# Patient Record
Sex: FEMALE | Race: WHITE | NOT HISPANIC OR LATINO
[De-identification: names, ages, dates, MRNs, and addresses within clinical notes are randomized per-mention and may not be internally consistent; named-entity substitution may affect disease eponyms.]

---

## 2017-07-11 PROBLEM — Z00.00 ENCOUNTER FOR PREVENTIVE HEALTH EXAMINATION: Status: ACTIVE | Noted: 2017-07-11

## 2017-07-21 ENCOUNTER — RESULT REVIEW (OUTPATIENT)
Age: 47
End: 2017-07-21

## 2017-07-21 ENCOUNTER — APPOINTMENT (OUTPATIENT)
Dept: WOUND CARE | Facility: CLINIC | Age: 47
End: 2017-07-21

## 2017-07-21 VITALS
BODY MASS INDEX: 35.82 KG/M2 | TEMPERATURE: 98.7 F | HEART RATE: 78 BPM | HEIGHT: 65 IN | DIASTOLIC BLOOD PRESSURE: 91 MMHG | SYSTOLIC BLOOD PRESSURE: 146 MMHG | RESPIRATION RATE: 16 BRPM | WEIGHT: 215 LBS

## 2017-07-21 DIAGNOSIS — Z87.42 PERSONAL HISTORY OF OTHER DISEASES OF THE FEMALE GENITAL TRACT: ICD-10-CM

## 2017-07-21 RX ORDER — AMLODIPINE BESYLATE 10 MG/1
10 TABLET ORAL
Refills: 0 | Status: ACTIVE | COMMUNITY

## 2017-07-24 ENCOUNTER — APPOINTMENT (OUTPATIENT)
Dept: VASCULAR SURGERY | Facility: CLINIC | Age: 47
End: 2017-07-24

## 2017-07-27 ENCOUNTER — APPOINTMENT (OUTPATIENT)
Dept: WOUND CARE | Facility: CLINIC | Age: 47
End: 2017-07-27
Payer: COMMERCIAL

## 2017-07-27 PROCEDURE — 11042 DBRDMT SUBQ TIS 1ST 20SQCM/<: CPT

## 2017-07-31 ENCOUNTER — APPOINTMENT (OUTPATIENT)
Dept: VASCULAR SURGERY | Facility: CLINIC | Age: 47
End: 2017-07-31
Payer: COMMERCIAL

## 2017-07-31 ENCOUNTER — APPOINTMENT (OUTPATIENT)
Dept: WOUND CARE | Facility: CLINIC | Age: 47
End: 2017-07-31
Payer: COMMERCIAL

## 2017-07-31 PROCEDURE — 93970 EXTREMITY STUDY: CPT

## 2017-07-31 PROCEDURE — 99213 OFFICE O/P EST LOW 20 MIN: CPT | Mod: 25

## 2017-07-31 PROCEDURE — 29581 APPL MULTLAYER CMPRN SYS LEG: CPT | Mod: LT

## 2017-07-31 RX ORDER — CEPHALEXIN 500 MG/1
500 CAPSULE ORAL
Qty: 20 | Refills: 0 | Status: COMPLETED | COMMUNITY
Start: 2017-06-12

## 2017-08-09 ENCOUNTER — APPOINTMENT (OUTPATIENT)
Dept: WOUND CARE | Facility: CLINIC | Age: 47
End: 2017-08-09
Payer: COMMERCIAL

## 2017-08-09 VITALS
HEART RATE: 74 BPM | TEMPERATURE: 98.4 F | SYSTOLIC BLOOD PRESSURE: 130 MMHG | RESPIRATION RATE: 16 BRPM | DIASTOLIC BLOOD PRESSURE: 70 MMHG

## 2017-08-09 PROCEDURE — 29581 APPL MULTLAYER CMPRN SYS LEG: CPT | Mod: LT

## 2017-08-16 ENCOUNTER — APPOINTMENT (OUTPATIENT)
Dept: WOUND CARE | Facility: CLINIC | Age: 47
End: 2017-08-16
Payer: COMMERCIAL

## 2017-08-16 VITALS
RESPIRATION RATE: 16 BRPM | SYSTOLIC BLOOD PRESSURE: 157 MMHG | TEMPERATURE: 98.6 F | DIASTOLIC BLOOD PRESSURE: 97 MMHG | HEART RATE: 77 BPM

## 2017-08-16 PROCEDURE — 97597 DBRDMT OPN WND 1ST 20 CM/<: CPT

## 2017-08-23 ENCOUNTER — APPOINTMENT (OUTPATIENT)
Dept: WOUND CARE | Facility: CLINIC | Age: 47
End: 2017-08-23
Payer: COMMERCIAL

## 2017-08-23 PROCEDURE — 99213 OFFICE O/P EST LOW 20 MIN: CPT

## 2017-08-28 ENCOUNTER — MESSAGE (OUTPATIENT)
Age: 47
End: 2017-08-28

## 2017-08-30 ENCOUNTER — APPOINTMENT (OUTPATIENT)
Dept: WOUND CARE | Facility: CLINIC | Age: 47
End: 2017-08-30
Payer: COMMERCIAL

## 2017-08-30 LAB
BACTERIA WND CULT: ABNORMAL
BACTERIA WND CULT: ABNORMAL

## 2017-08-30 PROCEDURE — 99213 OFFICE O/P EST LOW 20 MIN: CPT

## 2017-09-08 ENCOUNTER — APPOINTMENT (OUTPATIENT)
Dept: WOUND CARE | Facility: CLINIC | Age: 47
End: 2017-09-08
Payer: COMMERCIAL

## 2017-09-08 PROCEDURE — 99213 OFFICE O/P EST LOW 20 MIN: CPT

## 2017-09-14 ENCOUNTER — APPOINTMENT (OUTPATIENT)
Dept: ALLERGY | Facility: CLINIC | Age: 47
End: 2017-09-14
Payer: COMMERCIAL

## 2017-09-14 VITALS
BODY MASS INDEX: 36.65 KG/M2 | HEIGHT: 65 IN | SYSTOLIC BLOOD PRESSURE: 160 MMHG | RESPIRATION RATE: 14 BRPM | WEIGHT: 220 LBS | HEART RATE: 80 BPM | DIASTOLIC BLOOD PRESSURE: 100 MMHG

## 2017-09-14 PROCEDURE — 99244 OFF/OP CNSLTJ NEW/EST MOD 40: CPT

## 2017-09-25 ENCOUNTER — APPOINTMENT (OUTPATIENT)
Dept: ALLERGY | Facility: CLINIC | Age: 47
End: 2017-09-25
Payer: COMMERCIAL

## 2017-09-25 VITALS
SYSTOLIC BLOOD PRESSURE: 150 MMHG | DIASTOLIC BLOOD PRESSURE: 90 MMHG | WEIGHT: 220 LBS | BODY MASS INDEX: 36.65 KG/M2 | HEIGHT: 65 IN | RESPIRATION RATE: 14 BRPM | HEART RATE: 72 BPM

## 2017-09-25 PROCEDURE — 99214 OFFICE O/P EST MOD 30 MIN: CPT

## 2017-09-27 ENCOUNTER — APPOINTMENT (OUTPATIENT)
Dept: WOUND CARE | Facility: CLINIC | Age: 47
End: 2017-09-27
Payer: COMMERCIAL

## 2017-09-27 PROCEDURE — 99213 OFFICE O/P EST LOW 20 MIN: CPT

## 2017-10-18 ENCOUNTER — APPOINTMENT (OUTPATIENT)
Dept: WOUND CARE | Facility: CLINIC | Age: 47
End: 2017-10-18
Payer: COMMERCIAL

## 2017-10-18 PROCEDURE — 99213 OFFICE O/P EST LOW 20 MIN: CPT

## 2017-11-03 ENCOUNTER — APPOINTMENT (OUTPATIENT)
Dept: DERMATOLOGY | Facility: CLINIC | Age: 47
End: 2017-11-03
Payer: COMMERCIAL

## 2017-11-03 ENCOUNTER — MOBILE ON CALL (OUTPATIENT)
Age: 47
End: 2017-11-03

## 2017-11-03 VITALS — DIASTOLIC BLOOD PRESSURE: 72 MMHG | SYSTOLIC BLOOD PRESSURE: 120 MMHG

## 2017-11-03 DIAGNOSIS — Z91.89 OTHER SPECIFIED PERSONAL RISK FACTORS, NOT ELSEWHERE CLASSIFIED: ICD-10-CM

## 2017-11-03 DIAGNOSIS — Z86.79 PERSONAL HISTORY OF OTHER DISEASES OF THE CIRCULATORY SYSTEM: ICD-10-CM

## 2017-11-03 PROCEDURE — 99203 OFFICE O/P NEW LOW 30 MIN: CPT

## 2017-11-17 ENCOUNTER — APPOINTMENT (OUTPATIENT)
Dept: DERMATOLOGY | Facility: CLINIC | Age: 47
End: 2017-11-17
Payer: COMMERCIAL

## 2017-11-17 VITALS — DIASTOLIC BLOOD PRESSURE: 90 MMHG | SYSTOLIC BLOOD PRESSURE: 130 MMHG

## 2017-11-17 DIAGNOSIS — L25.9 UNSPECIFIED CONTACT DERMATITIS, UNSPECIFIED CAUSE: ICD-10-CM

## 2017-11-17 PROCEDURE — 99213 OFFICE O/P EST LOW 20 MIN: CPT

## 2017-11-17 RX ORDER — OLMESARTAN MEDOXOMIL 40 MG/1
TABLET, FILM COATED ORAL
Refills: 0 | Status: ACTIVE | COMMUNITY

## 2017-11-17 RX ORDER — CEPHALEXIN 500 MG/1
500 TABLET ORAL
Qty: 14 | Refills: 0 | Status: DISCONTINUED | COMMUNITY
Start: 2017-11-03 | End: 2017-11-17

## 2017-12-04 ENCOUNTER — INPATIENT (INPATIENT)
Facility: HOSPITAL | Age: 47
LOS: 5 days | Discharge: HOME | End: 2017-12-10
Attending: SURGERY | Admitting: SURGERY
Payer: COMMERCIAL

## 2017-12-04 VITALS
TEMPERATURE: 98 F | OXYGEN SATURATION: 99 % | HEART RATE: 89 BPM | SYSTOLIC BLOOD PRESSURE: 128 MMHG | DIASTOLIC BLOOD PRESSURE: 80 MMHG | RESPIRATION RATE: 19 BRPM

## 2017-12-04 DIAGNOSIS — Z98.89 OTHER SPECIFIED POSTPROCEDURAL STATES: Chronic | ICD-10-CM

## 2017-12-04 LAB
ALBUMIN SERPL ELPH-MCNC: 4.2 G/DL — SIGNIFICANT CHANGE UP (ref 3.3–5)
ALP SERPL-CCNC: 64 U/L — SIGNIFICANT CHANGE UP (ref 40–120)
ALT FLD-CCNC: 35 U/L — HIGH (ref 4–33)
AST SERPL-CCNC: 21 U/L — SIGNIFICANT CHANGE UP (ref 4–32)
BASOPHILS # BLD AUTO: 0.04 K/UL — SIGNIFICANT CHANGE UP (ref 0–0.2)
BASOPHILS NFR BLD AUTO: 0.4 % — SIGNIFICANT CHANGE UP (ref 0–2)
BASOPHILS NFR SPEC: 0 % — SIGNIFICANT CHANGE UP (ref 0–2)
BILIRUB SERPL-MCNC: 0.7 MG/DL — SIGNIFICANT CHANGE UP (ref 0.2–1.2)
BUN SERPL-MCNC: 31 MG/DL — HIGH (ref 7–23)
CALCIUM SERPL-MCNC: 10.4 MG/DL — SIGNIFICANT CHANGE UP (ref 8.4–10.5)
CHLORIDE SERPL-SCNC: 91 MMOL/L — LOW (ref 98–107)
CO2 SERPL-SCNC: 36 MMOL/L — HIGH (ref 22–31)
CREAT SERPL-MCNC: 1.05 MG/DL — SIGNIFICANT CHANGE UP (ref 0.5–1.3)
EOSINOPHIL # BLD AUTO: 0.01 K/UL — SIGNIFICANT CHANGE UP (ref 0–0.5)
EOSINOPHIL NFR BLD AUTO: 0.1 % — SIGNIFICANT CHANGE UP (ref 0–6)
EOSINOPHIL NFR FLD: 0 % — SIGNIFICANT CHANGE UP (ref 0–6)
GIANT PLATELETS BLD QL SMEAR: PRESENT — SIGNIFICANT CHANGE UP
GLUCOSE SERPL-MCNC: 129 MG/DL — HIGH (ref 70–99)
HCT VFR BLD CALC: 47.3 % — HIGH (ref 34.5–45)
HGB BLD-MCNC: 15.5 G/DL — SIGNIFICANT CHANGE UP (ref 11.5–15.5)
IMM GRANULOCYTES # BLD AUTO: 0.02 # — SIGNIFICANT CHANGE UP
IMM GRANULOCYTES NFR BLD AUTO: 0.2 % — SIGNIFICANT CHANGE UP (ref 0–1.5)
LIDOCAIN IGE QN: 20.6 U/L — SIGNIFICANT CHANGE UP (ref 7–60)
LYMPHOCYTES # BLD AUTO: 1.58 K/UL — SIGNIFICANT CHANGE UP (ref 1–3.3)
LYMPHOCYTES # BLD AUTO: 15.8 % — SIGNIFICANT CHANGE UP (ref 13–44)
LYMPHOCYTES NFR SPEC AUTO: 7.8 % — LOW (ref 13–44)
MCHC RBC-ENTMCNC: 28.9 PG — SIGNIFICANT CHANGE UP (ref 27–34)
MCHC RBC-ENTMCNC: 32.8 % — SIGNIFICANT CHANGE UP (ref 32–36)
MCV RBC AUTO: 88.1 FL — SIGNIFICANT CHANGE UP (ref 80–100)
MONOCYTES # BLD AUTO: 1.3 K/UL — HIGH (ref 0–0.9)
MONOCYTES NFR BLD AUTO: 13 % — SIGNIFICANT CHANGE UP (ref 2–14)
MONOCYTES NFR BLD: 10.4 % — HIGH (ref 2–9)
MORPHOLOGY BLD-IMP: NORMAL — SIGNIFICANT CHANGE UP
NEUTROPHIL AB SER-ACNC: 76.5 % — SIGNIFICANT CHANGE UP (ref 43–77)
NEUTROPHILS # BLD AUTO: 7.08 K/UL — SIGNIFICANT CHANGE UP (ref 1.8–7.4)
NEUTROPHILS NFR BLD AUTO: 70.5 % — SIGNIFICANT CHANGE UP (ref 43–77)
NRBC # FLD: 0 — SIGNIFICANT CHANGE UP
OTHER - HEMATOLOGY %: 0.9 — SIGNIFICANT CHANGE UP
PLATELET # BLD AUTO: 267 K/UL — SIGNIFICANT CHANGE UP (ref 150–400)
PLATELET COUNT - ESTIMATE: NORMAL — SIGNIFICANT CHANGE UP
PMV BLD: 11 FL — SIGNIFICANT CHANGE UP (ref 7–13)
POTASSIUM SERPL-MCNC: 3.6 MMOL/L — SIGNIFICANT CHANGE UP (ref 3.5–5.3)
POTASSIUM SERPL-SCNC: 3.6 MMOL/L — SIGNIFICANT CHANGE UP (ref 3.5–5.3)
PROT SERPL-MCNC: 8.1 G/DL — SIGNIFICANT CHANGE UP (ref 6–8.3)
RBC # BLD: 5.37 M/UL — HIGH (ref 3.8–5.2)
RBC # FLD: 14.4 % — SIGNIFICANT CHANGE UP (ref 10.3–14.5)
REVIEW TO FOLLOW: YES — SIGNIFICANT CHANGE UP
SODIUM SERPL-SCNC: 141 MMOL/L — SIGNIFICANT CHANGE UP (ref 135–145)
VARIANT LYMPHS # BLD: 4.4 % — SIGNIFICANT CHANGE UP
WBC # BLD: 10.03 K/UL — SIGNIFICANT CHANGE UP (ref 3.8–10.5)
WBC # FLD AUTO: 10.03 K/UL — SIGNIFICANT CHANGE UP (ref 3.8–10.5)

## 2017-12-04 PROCEDURE — 74177 CT ABD & PELVIS W/CONTRAST: CPT | Mod: 26

## 2017-12-04 RX ORDER — SODIUM CHLORIDE 9 MG/ML
1000 INJECTION INTRAMUSCULAR; INTRAVENOUS; SUBCUTANEOUS ONCE
Qty: 0 | Refills: 0 | Status: COMPLETED | OUTPATIENT
Start: 2017-12-04 | End: 2017-12-04

## 2017-12-04 RX ORDER — ONDANSETRON 8 MG/1
4 TABLET, FILM COATED ORAL ONCE
Qty: 0 | Refills: 0 | Status: COMPLETED | OUTPATIENT
Start: 2017-12-04 | End: 2017-12-04

## 2017-12-04 RX ORDER — FAMOTIDINE 10 MG/ML
20 INJECTION INTRAVENOUS ONCE
Qty: 0 | Refills: 0 | Status: COMPLETED | OUTPATIENT
Start: 2017-12-04 | End: 2017-12-04

## 2017-12-04 RX ADMIN — SODIUM CHLORIDE 2000 MILLILITER(S): 9 INJECTION INTRAMUSCULAR; INTRAVENOUS; SUBCUTANEOUS at 20:37

## 2017-12-04 RX ADMIN — FAMOTIDINE 20 MILLIGRAM(S): 10 INJECTION INTRAVENOUS at 20:37

## 2017-12-04 RX ADMIN — ONDANSETRON 4 MILLIGRAM(S): 8 TABLET, FILM COATED ORAL at 20:37

## 2017-12-04 NOTE — ED ADULT TRIAGE NOTE - CHIEF COMPLAINT QUOTE
Patient brought to ER from clinic by EMS for c/o orthostatic blood pressure. Pt was nauseous and Pt was given Zofran 4mg via 22 gauge right AC. Pt is here for epigastric burning.

## 2017-12-04 NOTE — ED PROVIDER NOTE - OBJECTIVE STATEMENT
48 y/o female hx HTN presents to ED c/o abdominal pain nausea vomit x 3 days. Pt. states 3 days ago she started to develop some mild epigastric discomfort which has progressively worsened over past 3 days with multiple episodes of nausea and bilious vomiting. States last BM yesterday morning. Pt. went to PMD today and states became dizzy there - was thought to possibly be dehydrated, given iv nasal saline and started to vomit again and was sent to ED for r/o sbo. Denies constipation, bloody stool, loc, cp sob.

## 2017-12-04 NOTE — ED ADULT NURSE NOTE - OBJECTIVE STATEMENT
pt received intake 12, alert and orientedx3. c.o abdominal pain with nausea/vomiting x3days. went to pmd and also c.o dizziness there. denies cp sob. respirations even unlabored. iv placed, labs sent. medicated per mar. will monitor.

## 2017-12-04 NOTE — ED PROVIDER NOTE - ATTENDING CONTRIBUTION TO CARE
I performed a face to face bedside interview with patient regarding history of present illness, review of symptoms and past medical history. I completed an independent physical exam.  I have discussed patient's plan of care.   I agree with note as stated above, having amended the EMR as needed to reflect my findings. I have discussed the assessment and plan of care.  This includes during the time I functioned as the attending physician for this patient.  Attending Contribution to Care: agree with plan of PA. pt p/w vague diffuse abd pain, with n/v. last passed gas 3 days ago. pending ct for sbo, if neg. will d/c with gastro meds. otherwise admit to surgery for sbo. pt with no active vomiting at this point.

## 2017-12-04 NOTE — ED PROVIDER NOTE - MEDICAL DECISION MAKING DETAILS
48 y/o female c/o abdominal pain nausea and vomiting  -r/o sbo vs gastritis vs gastroenteritis  -pain control, antiemetics  -ct abd pelvis

## 2017-12-05 ENCOUNTER — RESULT REVIEW (OUTPATIENT)
Age: 47
End: 2017-12-05

## 2017-12-05 DIAGNOSIS — N80.9 ENDOMETRIOSIS, UNSPECIFIED: Chronic | ICD-10-CM

## 2017-12-05 DIAGNOSIS — K56.690 OTHER PARTIAL INTESTINAL OBSTRUCTION: ICD-10-CM

## 2017-12-05 LAB
APTT BLD: 29.5 SEC — SIGNIFICANT CHANGE UP (ref 27.5–37.4)
BASE EXCESS BLDV CALC-SCNC: 11 MMOL/L — SIGNIFICANT CHANGE UP
BLD GP AB SCN SERPL QL: NEGATIVE — SIGNIFICANT CHANGE UP
BLOOD GAS VENOUS - CREATININE: 0.95 MG/DL — SIGNIFICANT CHANGE UP (ref 0.5–1.3)
BUN SERPL-MCNC: 18 MG/DL — SIGNIFICANT CHANGE UP (ref 7–23)
BUN SERPL-MCNC: 22 MG/DL — SIGNIFICANT CHANGE UP (ref 7–23)
CALCIUM SERPL-MCNC: 8.9 MG/DL — SIGNIFICANT CHANGE UP (ref 8.4–10.5)
CALCIUM SERPL-MCNC: 9.1 MG/DL — SIGNIFICANT CHANGE UP (ref 8.4–10.5)
CHLORIDE BLDV-SCNC: 94 MMOL/L — LOW (ref 96–108)
CHLORIDE SERPL-SCNC: 97 MMOL/L — LOW (ref 98–107)
CHLORIDE SERPL-SCNC: 98 MMOL/L — SIGNIFICANT CHANGE UP (ref 98–107)
CO2 SERPL-SCNC: 31 MMOL/L — SIGNIFICANT CHANGE UP (ref 22–31)
CO2 SERPL-SCNC: 34 MMOL/L — HIGH (ref 22–31)
CREAT SERPL-MCNC: 0.86 MG/DL — SIGNIFICANT CHANGE UP (ref 0.5–1.3)
CREAT SERPL-MCNC: 0.86 MG/DL — SIGNIFICANT CHANGE UP (ref 0.5–1.3)
GAS PNL BLDV: 130 MMOL/L — LOW (ref 136–146)
GLUCOSE BLDV-MCNC: 138 — HIGH (ref 70–99)
GLUCOSE SERPL-MCNC: 121 MG/DL — HIGH (ref 70–99)
GLUCOSE SERPL-MCNC: 157 MG/DL — HIGH (ref 70–99)
GRAM STN FLD: SIGNIFICANT CHANGE UP
HCO3 BLDV-SCNC: 34 MMOL/L — HIGH (ref 20–27)
HCT VFR BLD CALC: 41.9 % — SIGNIFICANT CHANGE UP (ref 34.5–45)
HCT VFR BLDV CALC: 46.6 % — HIGH (ref 34.5–45)
HGB BLD-MCNC: 13.2 G/DL — SIGNIFICANT CHANGE UP (ref 11.5–15.5)
HGB BLDV-MCNC: 15.2 G/DL — SIGNIFICANT CHANGE UP (ref 11.5–15.5)
INR BLD: 1.04 — SIGNIFICANT CHANGE UP (ref 0.88–1.17)
LACTATE BLDV-MCNC: 1.3 MMOL/L — SIGNIFICANT CHANGE UP (ref 0.5–2)
MAGNESIUM SERPL-MCNC: 1.9 MG/DL — SIGNIFICANT CHANGE UP (ref 1.6–2.6)
MCHC RBC-ENTMCNC: 27.8 PG — SIGNIFICANT CHANGE UP (ref 27–34)
MCHC RBC-ENTMCNC: 31.5 % — LOW (ref 32–36)
MCV RBC AUTO: 88.2 FL — SIGNIFICANT CHANGE UP (ref 80–100)
NRBC # FLD: 0 — SIGNIFICANT CHANGE UP
PCO2 BLDV: 49 MMHG — SIGNIFICANT CHANGE UP (ref 41–51)
PH BLDV: 7.47 PH — HIGH (ref 7.32–7.43)
PLATELET # BLD AUTO: 247 K/UL — SIGNIFICANT CHANGE UP (ref 150–400)
PMV BLD: 11.1 FL — SIGNIFICANT CHANGE UP (ref 7–13)
PO2 BLDV: 57 MMHG — HIGH (ref 35–40)
POTASSIUM BLDV-SCNC: 3 MMOL/L — LOW (ref 3.4–4.5)
POTASSIUM SERPL-MCNC: 2.9 MMOL/L — CRITICAL LOW (ref 3.5–5.3)
POTASSIUM SERPL-MCNC: 3.2 MMOL/L — LOW (ref 3.5–5.3)
POTASSIUM SERPL-SCNC: 2.9 MMOL/L — CRITICAL LOW (ref 3.5–5.3)
POTASSIUM SERPL-SCNC: 3.2 MMOL/L — LOW (ref 3.5–5.3)
PROTHROM AB SERPL-ACNC: 11.7 SEC — SIGNIFICANT CHANGE UP (ref 9.8–13.1)
RBC # BLD: 4.75 M/UL — SIGNIFICANT CHANGE UP (ref 3.8–5.2)
RBC # FLD: 14.2 % — SIGNIFICANT CHANGE UP (ref 10.3–14.5)
RH IG SCN BLD-IMP: POSITIVE — SIGNIFICANT CHANGE UP
SAO2 % BLDV: 89.4 % — HIGH (ref 60–85)
SODIUM SERPL-SCNC: 140 MMOL/L — SIGNIFICANT CHANGE UP (ref 135–145)
SODIUM SERPL-SCNC: 141 MMOL/L — SIGNIFICANT CHANGE UP (ref 135–145)
SPECIMEN SOURCE: SIGNIFICANT CHANGE UP
WBC # BLD: 7.08 K/UL — SIGNIFICANT CHANGE UP (ref 3.8–10.5)
WBC # FLD AUTO: 7.08 K/UL — SIGNIFICANT CHANGE UP (ref 3.8–10.5)

## 2017-12-05 PROCEDURE — 49561: CPT | Mod: GC

## 2017-12-05 PROCEDURE — 44130 BOWEL TO BOWEL FUSION: CPT | Mod: GC

## 2017-12-05 PROCEDURE — 88302 TISSUE EXAM BY PATHOLOGIST: CPT | Mod: 26

## 2017-12-05 PROCEDURE — 88307 TISSUE EXAM BY PATHOLOGIST: CPT | Mod: 26

## 2017-12-05 RX ORDER — SODIUM CHLORIDE 9 MG/ML
1000 INJECTION, SOLUTION INTRAVENOUS
Qty: 0 | Refills: 0 | Status: DISCONTINUED | OUTPATIENT
Start: 2017-12-05 | End: 2017-12-06

## 2017-12-05 RX ORDER — OMEPRAZOLE 10 MG/1
1 CAPSULE, DELAYED RELEASE ORAL
Qty: 0 | Refills: 0 | COMMUNITY

## 2017-12-05 RX ORDER — AZTREONAM 2 G
2000 VIAL (EA) INJECTION EVERY 12 HOURS
Qty: 0 | Refills: 0 | Status: DISCONTINUED | OUTPATIENT
Start: 2017-12-05 | End: 2017-12-05

## 2017-12-05 RX ORDER — AMLODIPINE BESYLATE 2.5 MG/1
1 TABLET ORAL
Qty: 0 | Refills: 0 | COMMUNITY

## 2017-12-05 RX ORDER — HYDROMORPHONE HYDROCHLORIDE 2 MG/ML
0.5 INJECTION INTRAMUSCULAR; INTRAVENOUS; SUBCUTANEOUS
Qty: 0 | Refills: 0 | Status: DISCONTINUED | OUTPATIENT
Start: 2017-12-05 | End: 2017-12-09

## 2017-12-05 RX ORDER — MAGNESIUM SULFATE 500 MG/ML
2 VIAL (ML) INJECTION ONCE
Qty: 0 | Refills: 0 | Status: DISCONTINUED | OUTPATIENT
Start: 2017-12-05 | End: 2017-12-05

## 2017-12-05 RX ORDER — HYDROMORPHONE HYDROCHLORIDE 2 MG/ML
30 INJECTION INTRAMUSCULAR; INTRAVENOUS; SUBCUTANEOUS
Qty: 0 | Refills: 0 | Status: DISCONTINUED | OUTPATIENT
Start: 2017-12-05 | End: 2017-12-09

## 2017-12-05 RX ORDER — HYDROMORPHONE HYDROCHLORIDE 2 MG/ML
0.5 INJECTION INTRAMUSCULAR; INTRAVENOUS; SUBCUTANEOUS
Qty: 0 | Refills: 0 | Status: DISCONTINUED | OUTPATIENT
Start: 2017-12-05 | End: 2017-12-05

## 2017-12-05 RX ORDER — AZTREONAM 2 G
VIAL (EA) INJECTION
Qty: 0 | Refills: 0 | Status: COMPLETED | OUTPATIENT
Start: 2017-12-05 | End: 2017-12-05

## 2017-12-05 RX ORDER — AZTREONAM 2 G
1000 VIAL (EA) INJECTION ONCE
Qty: 0 | Refills: 0 | Status: COMPLETED | OUTPATIENT
Start: 2017-12-05 | End: 2017-12-05

## 2017-12-05 RX ORDER — POTASSIUM CHLORIDE 20 MEQ
10 PACKET (EA) ORAL
Qty: 0 | Refills: 0 | Status: COMPLETED | OUTPATIENT
Start: 2017-12-05 | End: 2017-12-05

## 2017-12-05 RX ORDER — ONDANSETRON 8 MG/1
4 TABLET, FILM COATED ORAL ONCE
Qty: 0 | Refills: 0 | Status: COMPLETED | OUTPATIENT
Start: 2017-12-05 | End: 2017-12-05

## 2017-12-05 RX ORDER — NALOXONE HYDROCHLORIDE 4 MG/.1ML
0.1 SPRAY NASAL
Qty: 0 | Refills: 0 | Status: DISCONTINUED | OUTPATIENT
Start: 2017-12-05 | End: 2017-12-09

## 2017-12-05 RX ORDER — AZTREONAM 2 G
2000 VIAL (EA) INJECTION ONCE
Qty: 0 | Refills: 0 | Status: COMPLETED | OUTPATIENT
Start: 2017-12-05 | End: 2017-12-05

## 2017-12-05 RX ORDER — POLYETHYLENE GLYCOL 3350 17 G/17G
0 POWDER, FOR SOLUTION ORAL
Qty: 0 | Refills: 0 | COMMUNITY

## 2017-12-05 RX ORDER — POTASSIUM CHLORIDE 20 MEQ
10 PACKET (EA) ORAL
Qty: 0 | Refills: 0 | Status: DISCONTINUED | OUTPATIENT
Start: 2017-12-05 | End: 2017-12-05

## 2017-12-05 RX ORDER — SODIUM CHLORIDE 9 MG/ML
1000 INJECTION, SOLUTION INTRAVENOUS
Qty: 0 | Refills: 0 | Status: DISCONTINUED | OUTPATIENT
Start: 2017-12-05 | End: 2017-12-05

## 2017-12-05 RX ORDER — ENOXAPARIN SODIUM 100 MG/ML
40 INJECTION SUBCUTANEOUS DAILY
Qty: 0 | Refills: 0 | Status: DISCONTINUED | OUTPATIENT
Start: 2017-12-05 | End: 2017-12-08

## 2017-12-05 RX ORDER — PANTOPRAZOLE SODIUM 20 MG/1
40 TABLET, DELAYED RELEASE ORAL DAILY
Qty: 0 | Refills: 0 | Status: DISCONTINUED | OUTPATIENT
Start: 2017-12-05 | End: 2017-12-06

## 2017-12-05 RX ORDER — POTASSIUM CHLORIDE 20 MEQ
10 PACKET (EA) ORAL
Qty: 0 | Refills: 0 | Status: COMPLETED | OUTPATIENT
Start: 2017-12-05 | End: 2017-12-06

## 2017-12-05 RX ORDER — ONDANSETRON 8 MG/1
4 TABLET, FILM COATED ORAL EVERY 6 HOURS
Qty: 0 | Refills: 0 | Status: DISCONTINUED | OUTPATIENT
Start: 2017-12-05 | End: 2017-12-08

## 2017-12-05 RX ORDER — AZTREONAM 2 G
VIAL (EA) INJECTION
Qty: 0 | Refills: 0 | Status: DISCONTINUED | OUTPATIENT
Start: 2017-12-05 | End: 2017-12-08

## 2017-12-05 RX ORDER — AZTREONAM 2 G
1000 VIAL (EA) INJECTION EVERY 12 HOURS
Qty: 0 | Refills: 0 | Status: DISCONTINUED | OUTPATIENT
Start: 2017-12-06 | End: 2017-12-08

## 2017-12-05 RX ORDER — ONDANSETRON 8 MG/1
4 TABLET, FILM COATED ORAL ONCE
Qty: 0 | Refills: 0 | Status: DISCONTINUED | OUTPATIENT
Start: 2017-12-05 | End: 2017-12-05

## 2017-12-05 RX ORDER — MAGNESIUM SULFATE 500 MG/ML
1 VIAL (ML) INJECTION ONCE
Qty: 0 | Refills: 0 | Status: COMPLETED | OUTPATIENT
Start: 2017-12-05 | End: 2017-12-05

## 2017-12-05 RX ORDER — ASPIRIN/CALCIUM CARB/MAGNESIUM 324 MG
1 TABLET ORAL
Qty: 0 | Refills: 0 | COMMUNITY

## 2017-12-05 RX ORDER — DOCUSATE SODIUM 100 MG
1 CAPSULE ORAL
Qty: 0 | Refills: 0 | COMMUNITY

## 2017-12-05 RX ADMIN — Medication 100 MILLIEQUIVALENT(S): at 12:54

## 2017-12-05 RX ADMIN — SODIUM CHLORIDE 125 MILLILITER(S): 9 INJECTION, SOLUTION INTRAVENOUS at 08:30

## 2017-12-05 RX ADMIN — HYDROMORPHONE HYDROCHLORIDE 30 MILLILITER(S): 2 INJECTION INTRAMUSCULAR; INTRAVENOUS; SUBCUTANEOUS at 20:30

## 2017-12-05 RX ADMIN — Medication 50 MILLIGRAM(S): at 19:06

## 2017-12-05 RX ADMIN — ENOXAPARIN SODIUM 40 MILLIGRAM(S): 100 INJECTION SUBCUTANEOUS at 14:55

## 2017-12-05 RX ADMIN — ONDANSETRON 4 MILLIGRAM(S): 8 TABLET, FILM COATED ORAL at 01:05

## 2017-12-05 RX ADMIN — Medication 100 MILLIGRAM(S): at 05:18

## 2017-12-05 RX ADMIN — Medication 100 MILLIEQUIVALENT(S): at 10:59

## 2017-12-05 RX ADMIN — Medication 100 MILLIGRAM(S): at 13:41

## 2017-12-05 RX ADMIN — HYDROMORPHONE HYDROCHLORIDE 30 MILLILITER(S): 2 INJECTION INTRAMUSCULAR; INTRAVENOUS; SUBCUTANEOUS at 10:45

## 2017-12-05 RX ADMIN — Medication 100 MILLIEQUIVALENT(S): at 11:51

## 2017-12-05 RX ADMIN — PANTOPRAZOLE SODIUM 40 MILLIGRAM(S): 20 TABLET, DELAYED RELEASE ORAL at 13:12

## 2017-12-05 RX ADMIN — Medication 100 MILLIGRAM(S): at 21:43

## 2017-12-05 RX ADMIN — Medication 100 GRAM(S): at 10:50

## 2017-12-05 NOTE — BRIEF OPERATIVE NOTE - PROCEDURE
<<-----Click on this checkbox to enter Procedure Laparotomy  12/05/2017  repair of ventral hernia (no mesh), small bowel resection  Active  [id number]

## 2017-12-05 NOTE — H&P ADULT - NSHPPHYSICALEXAM_GEN_ALL_CORE
Physical Exam  T(C): 36.8 (12-04-17 @ 20:38), Max: 36.8 (12-04-17 @ 20:38)  HR: 90 (12-04-17 @ 20:38) (89 - 90)  BP: 120/87 (12-04-17 @ 20:38) (120/87 - 128/80)  RR: 16 (12-04-17 @ 20:38) (16 - 19)  SpO2: 100% (12-04-17 @ 20:38) (99% - 100%)  Tmax: T(C): , Max: 36.8 (12-04-17 @ 20:38)    Gen: NAD  HEENT: normocephalic, atraumatic, no scleral icterus  CV: S1, S2, RRR  Pulm: nonlabored respirations  Abd: Soft, obese, ND, mildly tender, no rebound, no guarding. Infraumbilical mass palpable at midline near transverse incision. No skin changes, soft.  Ext: warm, no edema

## 2017-12-05 NOTE — BRIEF OPERATIVE NOTE - OPERATION/FINDINGS
there were loops of bowel tightly adherent to the abdominal wall. During lysis of adhesion, enterotomy was inevitable. This piece of bowel (about 8cm) was resected. Proximal bowel was dilated. There were punctate duskiness proximally, which improved through out the course of the case. SBR and primary anastomosis. Fascia closed with interrupted vicryl. 2 Dima in the SQ there were loops of bowel tightly adherent to the abdominal wall. During lysis of adhesion, enterotomy was inevitable. This piece of bowel (about 8cm) was resected. Proximal bowel was dilated. There was some enteric content spillage and the abdomen was copiously irrigated. There were punctate duskiness proximally, which improved through out the course of the case. SBR and primary anastomosis. Fascia closed with interrupted vicryl. 2 Dima in the SQ

## 2017-12-05 NOTE — H&P ADULT - ASSESSMENT
47 year old female with a history of exlap L ovarian cystectomy presents with 3 days of abdominal pain, nausea/vomiting, minimally tender on exam, WBC 10, lactate 1.3. CT scan shows high-grade SBO with transition point at the ventral hernia.   -	Admit to surgery Dr. Hunter  -	NPO  -	IVF  -	NGT placed at bedside with return of 900 cc bilious material  -	Pt consented for OR: ex lap, repair of incarcerated ventral hernia, possible bowel resection  -	Added onto emergency schedule  -	Pt seen and examined with attending Elsa Kline Khanh R4  B Team Surgery [id number]

## 2017-12-05 NOTE — H&P ADULT - HISTORY OF PRESENT ILLNESS
CC: Patient is a 47 year old female who presents with a chief complaint of upper abdominal pain      Patient is a 47 year old female with history of ex lap L ovarian endometrioma resection in 2008, presents with 3 days of upper abdominal pain, followed by nausea and vomiting. Last flatus and BM 2 days ago. She was seen by her PMD who sent her to the ED for further evaluation.     The patient reports having similar pain 3-4 years ago, and was seen in the ED where she was found to be dehydrated and was sent for an EGD which showed "irritation." Denies history of colonoscopies. The patient reports that she has had the ventral hernia for a long time. The initial incision became infected after surgery and required packing. She noticed that there was a bulge by the incision soon after. Denies having obstructions in the past.      PMH  HTN  Endometrioma  GERD  LLE cellulitis and ulceration    PSH  Diagnostic lap, ex lap left ovarian cystectomy 2008    MEDS  Valsartan-HCTZ 320-25  Amlodipine 10  ASA 81  [id number]  Colace 100 mg  Miralax daily    Allergies  No Known Allergies or Intolerances

## 2017-12-05 NOTE — PROGRESS NOTE ADULT - SUBJECTIVE AND OBJECTIVE BOX
POST OPERATIVE NOTE   B  Team surgery    STATUS POST: s/p SBR with primary anastomosis second to incarcerated hernia      SUBJECTIVE: The Pt tolerated the procedure well. Since leaving the OR she denies any pain. Her pain is well controlled on IV PCA. She denies any CP, SOB, N/V.    OBJECTIVE:  Vital Signs Last 24 Hrs  T(C): 36.8 (05 Dec 2017 15:00), Max: 37.9 (05 Dec 2017 08:15)  T(F): 98.2 (05 Dec 2017 15:00), Max: 100.2 (05 Dec 2017 08:15)  HR: 90 (05 Dec 2017 16:00) (82 - 98)  BP: 135/68 (05 Dec 2017 16:00) (104/78 - 148/93)  BP(mean): --  RR: 15 (05 Dec 2017 16:00) (10 - 20)  SpO2: 98% (05 Dec 2017 16:00) (95% - 100%)  I&O's Summary    05 Dec 2017 07:01  -  05 Dec 2017 16:44  --------------------------------------------------------  IN: 1500 mL / OUT: 1025 mL / NET: 475 mL      I&O's Detail    05 Dec 2017 07:01  -  05 Dec 2017 16:44  --------------------------------------------------------  IN:    IV PiggyBack: 500 mL    lactated ringers.: 1000 mL  Total IN: 1500 mL    OUT:    Bulb: 12.5 mL    Bulb: 12.5 mL    Indwelling Catheter - Urethral: 825 mL    Nasoenteral Tube: 175 mL  Total OUT: 1025 mL    Total NET: 475 mL    MEDICATIONS  (STANDING):  aztreonam  IVPB      clindamycin IVPB      clindamycin IVPB 900 milliGRAM(s) IV Intermittent once  clindamycin IVPB 900 milliGRAM(s) IV Intermittent every 8 hours  enoxaparin Injectable 40 milliGRAM(s) SubCutaneous daily  HYDROmorphone PCA (1 mG/mL) 30 milliLiter(s) PCA Continuous PCA Continuous  lactated ringers. 1000 milliLiter(s) (125 mL/Hr) IV Continuous <Continuous>  pantoprazole  Injectable 40 milliGRAM(s) IV Push daily    MEDICATIONS  (PRN):  HYDROmorphone  Injectable 0.5 milliGRAM(s) IV Push every 10 minutes PRN Moderate Pain (4 - 6)  HYDROmorphone PCA (1 mG/mL) Rescue Clinician Bolus 0.5 milliGRAM(s) IV Push every 15 minutes PRN for Pain Scale GREATER THAN 6  naloxone Injectable 0.1 milliGRAM(s) IV Push every 3 minutes PRN For ANY of the following changes in patient status:  A. RR LESS THAN 10 breaths per minute, B. Oxygen saturation LESS THAN 90%, C. Sedation score of 6  ondansetron Injectable 4 milliGRAM(s) IV Push every 6 hours PRN Nausea  ondansetron Injectable 4 milliGRAM(s) IV Push once PRN Nausea and/or Vomiting      LABS:                        13.2   7.08  )-----------( 247      ( 05 Dec 2017 09:00 )             41.9     12-05    140  |  97<L>  |  22  ----------------------------<  157<H>  2.9<LL>   |  31  |  0.86    Ca    8.9      05 Dec 2017 09:00  Mg     1.9     12-05    TPro  8.1  /  Alb  4.2  /  TBili  0.7  /  DBili  x   /  AST  21  /  ALT  35<H>  /  AlkPhos  64  12-04    PT/INR - ( 05 Dec 2017 02:10 )   PT: 11.7 SEC;   INR: 1.04          PTT - ( 05 Dec 2017 02:10 )  PTT:29.5 SEC      GEN: NAD, AOx3  ABD: Soft, ND, appropriately tender, surgical site dressings are C/D/I, drain serous sanguinous x2  EXTREMITIES: neg edema    A/P: s/p SBR with primary anastomosis second to incarcerated hernia    - Pain control  - DVT ppx  - f/u a.m. labs  - OOB  - NPO/NGT

## 2017-12-05 NOTE — H&P ADULT - NSHPLABSRESULTS_GEN_ALL_CORE
Labs:                        15.5   10.03 )-----------( 267      ( 04 Dec 2017 20:15 )             47.3     12-04    141  |  91<L>  |  31<H>  ----------------------------<  129<H>  3.6   |  36<H>  |  1.05    Ca    10.4      04 Dec 2017 20:15    TPro  8.1  /  Alb  4.2  /  TBili  0.7  /  DBili  x   /  AST  21  /  ALT  35<H>  /  AlkPhos  64  12-04              Imaging  < from: CT Abdomen and Pelvis w/ Oral Cont and w/ IV Cont (12.04.17 @ 23:29) >  BOWEL: High-grade small bowel obstruction with transition point in the   anterior lower pelvis at the opening of the patient's large bilobed   infraumbilical midline ventral abdominal wall hernia containing multiple   collapsed loops of small bowel. Hernia neck measures 3.6 cm. Appendix is   normal.  PERITONEUM: No ascites or pneumoperitoneum. No loculated fluid collection   to suggest abscess. No mesenteric adenopathy.  VESSELS: Compression of the superior mesenteric vein at the entry point   of the hernia sac. Normal caliber abdominal aorta.  RETROPERITONEUM: Nolymphadenopathy.    ABDOMINAL WALL: Ventral hernia, as described.  BONES: Degenerative changes of the spine predominantly at L5-S1.    IMPRESSION: High-grade small bowel obstruction with transition point at   the opening of a large ventral midline infraumbilical ventral hernia.   Small bowel loops within the hernia sac are collapsed.    Partially visualized opacities of the bilateral lung bases may represent   areas of atelectasis or pneumonia.     < end of copied text >

## 2017-12-06 LAB
BASE EXCESS BLDA CALC-SCNC: 5.7 MMOL/L — SIGNIFICANT CHANGE UP
BUN SERPL-MCNC: 18 MG/DL — SIGNIFICANT CHANGE UP (ref 7–23)
CALCIUM SERPL-MCNC: 9.1 MG/DL — SIGNIFICANT CHANGE UP (ref 8.4–10.5)
CHLORIDE SERPL-SCNC: 101 MMOL/L — SIGNIFICANT CHANGE UP (ref 98–107)
CK MB BLD-MCNC: 0.8 — SIGNIFICANT CHANGE UP (ref 0–2.5)
CK MB BLD-MCNC: 5.72 NG/ML — HIGH (ref 1–4.7)
CK MB BLD-MCNC: 7.63 NG/ML — HIGH (ref 1–4.7)
CK SERPL-CCNC: 735 U/L — HIGH (ref 25–170)
CK SERPL-CCNC: 790 U/L — HIGH (ref 25–170)
CO2 SERPL-SCNC: 31 MMOL/L — SIGNIFICANT CHANGE UP (ref 22–31)
CREAT SERPL-MCNC: 0.77 MG/DL — SIGNIFICANT CHANGE UP (ref 0.5–1.3)
GLUCOSE BLDA-MCNC: 127 MG/DL — HIGH (ref 70–99)
GLUCOSE SERPL-MCNC: 105 MG/DL — HIGH (ref 70–99)
HCO3 BLDA-SCNC: 30 MMOL/L — HIGH (ref 22–26)
HCT VFR BLD CALC: 37.4 % — SIGNIFICANT CHANGE UP (ref 34.5–45)
HCT VFR BLDA CALC: 37.8 % — SIGNIFICANT CHANGE UP (ref 34.5–46.5)
HGB BLD-MCNC: 12.1 G/DL — SIGNIFICANT CHANGE UP (ref 11.5–15.5)
HGB BLDA-MCNC: 12.3 G/DL — SIGNIFICANT CHANGE UP (ref 11.5–15.5)
MCHC RBC-ENTMCNC: 28.2 PG — SIGNIFICANT CHANGE UP (ref 27–34)
MCHC RBC-ENTMCNC: 32.4 % — SIGNIFICANT CHANGE UP (ref 32–36)
MCV RBC AUTO: 87.2 FL — SIGNIFICANT CHANGE UP (ref 80–100)
NRBC # FLD: 0 — SIGNIFICANT CHANGE UP
PCO2 BLDA: 38 MMHG — SIGNIFICANT CHANGE UP (ref 32–48)
PH BLDA: 7.5 PH — HIGH (ref 7.35–7.45)
PLATELET # BLD AUTO: 205 K/UL — SIGNIFICANT CHANGE UP (ref 150–400)
PMV BLD: 11.4 FL — SIGNIFICANT CHANGE UP (ref 7–13)
PO2 BLDA: 65 MMHG — LOW (ref 83–108)
POTASSIUM BLDA-SCNC: 3.2 MMOL/L — LOW (ref 3.4–4.5)
POTASSIUM SERPL-MCNC: 3.3 MMOL/L — LOW (ref 3.5–5.3)
POTASSIUM SERPL-SCNC: 3.3 MMOL/L — LOW (ref 3.5–5.3)
RBC # BLD: 4.29 M/UL — SIGNIFICANT CHANGE UP (ref 3.8–5.2)
RBC # FLD: 14.6 % — HIGH (ref 10.3–14.5)
SAO2 % BLDA: 94.4 % — LOW (ref 95–99)
SODIUM BLDA-SCNC: 136 MMOL/L — SIGNIFICANT CHANGE UP (ref 136–146)
SODIUM SERPL-SCNC: 142 MMOL/L — SIGNIFICANT CHANGE UP (ref 135–145)
SPECIMEN SOURCE: SIGNIFICANT CHANGE UP
TROPONIN T SERPL-MCNC: < 0.06 NG/ML — SIGNIFICANT CHANGE UP (ref 0–0.06)
TROPONIN T SERPL-MCNC: < 0.06 NG/ML — SIGNIFICANT CHANGE UP (ref 0–0.06)
WBC # BLD: 7 K/UL — SIGNIFICANT CHANGE UP (ref 3.8–10.5)
WBC # FLD AUTO: 7 K/UL — SIGNIFICANT CHANGE UP (ref 3.8–10.5)

## 2017-12-06 PROCEDURE — 93010 ELECTROCARDIOGRAM REPORT: CPT

## 2017-12-06 RX ORDER — ACETAMINOPHEN 500 MG
1000 TABLET ORAL ONCE
Qty: 0 | Refills: 0 | Status: COMPLETED | OUTPATIENT
Start: 2017-12-06 | End: 2017-12-06

## 2017-12-06 RX ORDER — POTASSIUM CHLORIDE 20 MEQ
10 PACKET (EA) ORAL
Qty: 0 | Refills: 0 | Status: COMPLETED | OUTPATIENT
Start: 2017-12-06 | End: 2017-12-06

## 2017-12-06 RX ORDER — SODIUM CHLORIDE 9 MG/ML
1000 INJECTION, SOLUTION INTRAVENOUS ONCE
Qty: 0 | Refills: 0 | Status: COMPLETED | OUTPATIENT
Start: 2017-12-06 | End: 2017-12-06

## 2017-12-06 RX ORDER — DEXTROSE MONOHYDRATE, SODIUM CHLORIDE, AND POTASSIUM CHLORIDE 50; .745; 4.5 G/1000ML; G/1000ML; G/1000ML
1000 INJECTION, SOLUTION INTRAVENOUS
Qty: 0 | Refills: 0 | Status: DISCONTINUED | OUTPATIENT
Start: 2017-12-06 | End: 2017-12-08

## 2017-12-06 RX ORDER — PANTOPRAZOLE SODIUM 20 MG/1
40 TABLET, DELAYED RELEASE ORAL DAILY
Qty: 0 | Refills: 0 | Status: DISCONTINUED | OUTPATIENT
Start: 2017-12-06 | End: 2017-12-08

## 2017-12-06 RX ADMIN — HYDROMORPHONE HYDROCHLORIDE 30 MILLILITER(S): 2 INJECTION INTRAMUSCULAR; INTRAVENOUS; SUBCUTANEOUS at 08:11

## 2017-12-06 RX ADMIN — Medication 100 MILLIEQUIVALENT(S): at 15:00

## 2017-12-06 RX ADMIN — Medication 100 MILLIGRAM(S): at 22:13

## 2017-12-06 RX ADMIN — DEXTROSE MONOHYDRATE, SODIUM CHLORIDE, AND POTASSIUM CHLORIDE 125 MILLILITER(S): 50; .745; 4.5 INJECTION, SOLUTION INTRAVENOUS at 22:14

## 2017-12-06 RX ADMIN — Medication 100 MILLIEQUIVALENT(S): at 14:00

## 2017-12-06 RX ADMIN — Medication 1.25 MILLIGRAM(S): at 13:16

## 2017-12-06 RX ADMIN — Medication 400 MILLIGRAM(S): at 01:19

## 2017-12-06 RX ADMIN — Medication 100 MILLIEQUIVALENT(S): at 00:01

## 2017-12-06 RX ADMIN — Medication 1.25 MILLIGRAM(S): at 18:32

## 2017-12-06 RX ADMIN — HYDROMORPHONE HYDROCHLORIDE 30 MILLILITER(S): 2 INJECTION INTRAMUSCULAR; INTRAVENOUS; SUBCUTANEOUS at 20:14

## 2017-12-06 RX ADMIN — Medication 100 MILLIEQUIVALENT(S): at 02:20

## 2017-12-06 RX ADMIN — PANTOPRAZOLE SODIUM 40 MILLIGRAM(S): 20 TABLET, DELAYED RELEASE ORAL at 13:15

## 2017-12-06 RX ADMIN — Medication 100 MILLIGRAM(S): at 06:28

## 2017-12-06 RX ADMIN — Medication 100 MILLIEQUIVALENT(S): at 13:15

## 2017-12-06 RX ADMIN — Medication 50 MILLIGRAM(S): at 06:28

## 2017-12-06 RX ADMIN — Medication 50 MILLIGRAM(S): at 18:32

## 2017-12-06 RX ADMIN — Medication 1000 MILLIGRAM(S): at 01:49

## 2017-12-06 RX ADMIN — DEXTROSE MONOHYDRATE, SODIUM CHLORIDE, AND POTASSIUM CHLORIDE 125 MILLILITER(S): 50; .745; 4.5 INJECTION, SOLUTION INTRAVENOUS at 13:17

## 2017-12-06 RX ADMIN — SODIUM CHLORIDE 125 MILLILITER(S): 9 INJECTION, SOLUTION INTRAVENOUS at 01:40

## 2017-12-06 RX ADMIN — SODIUM CHLORIDE 1333.33 MILLILITER(S): 9 INJECTION, SOLUTION INTRAVENOUS at 01:15

## 2017-12-06 RX ADMIN — ENOXAPARIN SODIUM 40 MILLIGRAM(S): 100 INJECTION SUBCUTANEOUS at 13:18

## 2017-12-06 RX ADMIN — Medication 100 MILLIGRAM(S): at 13:15

## 2017-12-06 RX ADMIN — Medication 100 MILLIEQUIVALENT(S): at 01:20

## 2017-12-06 RX ADMIN — Medication 2.5 MILLIGRAM(S): at 07:59

## 2017-12-06 NOTE — PROVIDER CONTACT NOTE (OTHER) - SITUATION
Upon afternoon vitals patients HR was 103, 107 and back down to 99. Patient BP was 155/98, and O2 ranging from 90-94% with face tent.

## 2017-12-06 NOTE — PROGRESS NOTE ADULT - SUBJECTIVE AND OBJECTIVE BOX
Anesthesia Pain Management Service- Attending Addendum    SUBJECTIVE: Pt doing well with IV PCA without problems reported.    Therapy:	  [ X] IV PCA	   [ ] Epidural           [ ] s/p Spinal Opoid              [ ] Postpartum infusion	  [ ] Patient controlled regional anesthesia (PCRA)    [ ] prn Analgesics    Allergies    No Known Allergies    Intolerances      MEDICATIONS  (STANDING):  aztreonam  IVPB      aztreonam  IVPB 1000 milliGRAM(s) IV Intermittent every 12 hours  clindamycin IVPB      clindamycin IVPB 900 milliGRAM(s) IV Intermittent once  clindamycin IVPB 900 milliGRAM(s) IV Intermittent every 8 hours  dextrose 5% + sodium chloride 0.45% with potassium chloride 20 mEq/L 1000 milliLiter(s) (125 mL/Hr) IV Continuous <Continuous>  enalaprilat Injectable 1.25 milliGRAM(s) IV Push every 6 hours  enoxaparin Injectable 40 milliGRAM(s) SubCutaneous daily  HYDROmorphone PCA (1 mG/mL) 30 milliLiter(s) PCA Continuous PCA Continuous  pantoprazole  Injectable 40 milliGRAM(s) IV Push daily  potassium chloride  10 mEq/100 mL IVPB 10 milliEquivalent(s) IV Intermittent every 1 hour    MEDICATIONS  (PRN):  HYDROmorphone PCA (1 mG/mL) Rescue Clinician Bolus 0.5 milliGRAM(s) IV Push every 15 minutes PRN for Pain Scale GREATER THAN 6  naloxone Injectable 0.1 milliGRAM(s) IV Push every 3 minutes PRN For ANY of the following changes in patient status:  A. RR LESS THAN 10 breaths per minute, B. Oxygen saturation LESS THAN 90%, C. Sedation score of 6  ondansetron Injectable 4 milliGRAM(s) IV Push every 6 hours PRN Nausea      OBJECTIVE:   [X] No new signs     [ ] Other:    Side Effects:  [X ] None			[ ] Other:    Assessment of Catheter Site:		[ ] Intact		[ ] Other:    ASSESSMENT/PLAN  [ X] Continue current therapy    [ ] Therapy changed to:    [ ] IV PCA       [ ] Epidural     [ ] prn Analgesics     Comments:

## 2017-12-06 NOTE — PROGRESS NOTE ADULT - SUBJECTIVE AND OBJECTIVE BOX
B Team (General Surgery) Daily Progress Note    SUBJECTIVE:  Pt seen and examined, and is resting comfortably in bed. No acute events overnight. Pain is adequately controlled on current regimen. Pt has no complaints at this time. Negative for flatus and BM.     OBJECTIVE:  Vital Signs Last 24 Hrs  T(C): 37.4 (06 Dec 2017 10:17), Max: 37.7 (05 Dec 2017 21:52)  T(F): 99.4 (06 Dec 2017 10:17), Max: 99.9 (05 Dec 2017 21:52)  HR: 94 (06 Dec 2017 10:17) (82 - 124)  BP: 139/85 (06 Dec 2017 10:17) (104/74 - 148/93)  BP(mean): --  RR: 18 (06 Dec 2017 10:17) (14 - 20)  SpO2: 94% (06 Dec 2017 10:17) (91% - 100%)    I&O's Detail    05 Dec 2017 07:01  -  06 Dec 2017 07:00  --------------------------------------------------------  IN:    IV PiggyBack: 600 mL    Lactated Ringers IV Bolus: 1000 mL    lactated ringers.: 2375 mL  Total IN: 3975 mL    OUT:    Bulb: 37.5 mL    Bulb: 20 mL    Indwelling Catheter - Urethral: 1300 mL    Nasoenteral Tube: 175 mL  Total OUT: 1532.5 mL    Total NET: 2442.5 mL      06 Dec 2017 07:01  -  06 Dec 2017 12:41  --------------------------------------------------------  IN:    lactated ringers.: 500 mL  Total IN: 500 mL    OUT:    Bulb: 2.5 mL    Indwelling Catheter - Urethral: 450 mL    Nasoenteral Tube: 200 mL  Total OUT: 652.5 mL    Total NET: -152.5 mL        Exam:  GEN: A&Ox3, NAD  HEENT: atraumatic, normocephalic  CV: no JVD  RESP: no increased work of breathing, no use of accessory muscles  GI/ABD: soft, appropriately tender, mildly distended, no rebound or guarding, incision is c/d/i, JPx2 are serosanguinous, bulbs are not holding suction  : deferred  EXTREMITIES: warm, pink, well-perfused                        12.1   7.00  )-----------( 205      ( 06 Dec 2017 06:45 )             37.4       12-06    142  |  101  |  18  ----------------------------<  105<H>  3.3<L>   |  31  |  0.77    Ca    9.1      06 Dec 2017 06:45  Mg     1.9     12-05    TPro  8.1  /  Alb  4.2  /  TBili  0.7  /  DBili  x   /  AST  21  /  ALT  35<H>  /  AlkPhos  64  12-04      PT/INR - ( 05 Dec 2017 02:10 )   PT: 11.7 SEC;   INR: 1.04          PTT - ( 05 Dec 2017 02:10 )  PTT:29.5 SEC    ASSESSMENT:  47 year old female with a history of ex-lap L ovarian cystectomy presents with 3 days of abdominal pain, nausea/vomiting, CT scan showed high-grade SBO with transition point at the ventral hernia s/p primary ventral hernia repair and small bowel resection.     PLAN:    - Diet: NPO/NGT  - Continue Protonix  - D/c L Dima drain  - Created better seal on R Dima drain  - Activity: OOB/ambulation  - Incentive spirometry  - DVT prophylaxis  - Dispo: admitted to B-team surgery        Warner Kaur MD PGY-1  pager: [phone]

## 2017-12-07 ENCOUNTER — TRANSCRIPTION ENCOUNTER (OUTPATIENT)
Age: 47
End: 2017-12-07

## 2017-12-07 LAB
-  AMIKACIN: SIGNIFICANT CHANGE UP
-  AMPICILLIN/SULBACTAM: SIGNIFICANT CHANGE UP
-  AMPICILLIN: SIGNIFICANT CHANGE UP
-  AZTREONAM: SIGNIFICANT CHANGE UP
-  CEFAZOLIN: SIGNIFICANT CHANGE UP
-  CEFEPIME: SIGNIFICANT CHANGE UP
-  CEFOXITIN: SIGNIFICANT CHANGE UP
-  CEFTAZIDIME: SIGNIFICANT CHANGE UP
-  CEFTRIAXONE: SIGNIFICANT CHANGE UP
-  CIPROFLOXACIN: SIGNIFICANT CHANGE UP
-  ERTAPENEM: SIGNIFICANT CHANGE UP
-  GENTAMICIN: SIGNIFICANT CHANGE UP
-  IMIPENEM: SIGNIFICANT CHANGE UP
-  LEVOFLOXACIN: SIGNIFICANT CHANGE UP
-  MEROPENEM: SIGNIFICANT CHANGE UP
-  PIPERACILLIN/TAZOBACTAM: SIGNIFICANT CHANGE UP
-  TIGECYCLINE: SIGNIFICANT CHANGE UP
-  TOBRAMYCIN: SIGNIFICANT CHANGE UP
-  TRIMETHOPRIM/SULFAMETHOXAZOLE: SIGNIFICANT CHANGE UP
BACTERIA FLD CULT: SIGNIFICANT CHANGE UP
BUN SERPL-MCNC: 13 MG/DL — SIGNIFICANT CHANGE UP (ref 7–23)
CALCIUM SERPL-MCNC: 9 MG/DL — SIGNIFICANT CHANGE UP (ref 8.4–10.5)
CHLORIDE SERPL-SCNC: 103 MMOL/L — SIGNIFICANT CHANGE UP (ref 98–107)
CO2 SERPL-SCNC: 28 MMOL/L — SIGNIFICANT CHANGE UP (ref 22–31)
CREAT SERPL-MCNC: 0.67 MG/DL — SIGNIFICANT CHANGE UP (ref 0.5–1.3)
GLUCOSE SERPL-MCNC: 131 MG/DL — HIGH (ref 70–99)
HCT VFR BLD CALC: 36 % — SIGNIFICANT CHANGE UP (ref 34.5–45)
HCT VFR BLD CALC: 37.1 % — SIGNIFICANT CHANGE UP (ref 34.5–45)
HGB BLD-MCNC: 11.8 G/DL — SIGNIFICANT CHANGE UP (ref 11.5–15.5)
HGB BLD-MCNC: 12 G/DL — SIGNIFICANT CHANGE UP (ref 11.5–15.5)
MAGNESIUM SERPL-MCNC: 2.3 MG/DL — SIGNIFICANT CHANGE UP (ref 1.6–2.6)
MCHC RBC-ENTMCNC: 27.8 PG — SIGNIFICANT CHANGE UP (ref 27–34)
MCHC RBC-ENTMCNC: 29.5 PG — SIGNIFICANT CHANGE UP (ref 27–34)
MCHC RBC-ENTMCNC: 31.8 % — LOW (ref 32–36)
MCHC RBC-ENTMCNC: 33.3 % — SIGNIFICANT CHANGE UP (ref 32–36)
MCV RBC AUTO: 87.3 FL — SIGNIFICANT CHANGE UP (ref 80–100)
MCV RBC AUTO: 88.5 FL — SIGNIFICANT CHANGE UP (ref 80–100)
METHOD TYPE: SIGNIFICANT CHANGE UP
NRBC # FLD: 0 — SIGNIFICANT CHANGE UP
NRBC # FLD: 0 — SIGNIFICANT CHANGE UP
ORGANISM # SPEC MICROSCOPIC CNT: SIGNIFICANT CHANGE UP
PHOSPHATE SERPL-MCNC: 1.8 MG/DL — LOW (ref 2.5–4.5)
PLATELET # BLD AUTO: 218 K/UL — SIGNIFICANT CHANGE UP (ref 150–400)
PLATELET # BLD AUTO: 236 K/UL — SIGNIFICANT CHANGE UP (ref 150–400)
PMV BLD: 11.7 FL — SIGNIFICANT CHANGE UP (ref 7–13)
PMV BLD: 11.7 FL — SIGNIFICANT CHANGE UP (ref 7–13)
POTASSIUM SERPL-MCNC: 3.4 MMOL/L — LOW (ref 3.5–5.3)
POTASSIUM SERPL-SCNC: 3.4 MMOL/L — LOW (ref 3.5–5.3)
RBC # BLD: 4.07 M/UL — SIGNIFICANT CHANGE UP (ref 3.8–5.2)
RBC # BLD: 4.25 M/UL — SIGNIFICANT CHANGE UP (ref 3.8–5.2)
RBC # FLD: 14.6 % — HIGH (ref 10.3–14.5)
RBC # FLD: 14.6 % — HIGH (ref 10.3–14.5)
SODIUM SERPL-SCNC: 141 MMOL/L — SIGNIFICANT CHANGE UP (ref 135–145)
WBC # BLD: 10.16 K/UL — SIGNIFICANT CHANGE UP (ref 3.8–10.5)
WBC # BLD: 9.6 K/UL — SIGNIFICANT CHANGE UP (ref 3.8–10.5)
WBC # FLD AUTO: 10.16 K/UL — SIGNIFICANT CHANGE UP (ref 3.8–10.5)
WBC # FLD AUTO: 9.6 K/UL — SIGNIFICANT CHANGE UP (ref 3.8–10.5)

## 2017-12-07 PROCEDURE — 71010: CPT | Mod: 26

## 2017-12-07 RX ORDER — ACETAMINOPHEN 500 MG
1000 TABLET ORAL ONCE
Qty: 0 | Refills: 0 | Status: COMPLETED | OUTPATIENT
Start: 2017-12-07 | End: 2017-12-07

## 2017-12-07 RX ORDER — POTASSIUM PHOSPHATE, MONOBASIC POTASSIUM PHOSPHATE, DIBASIC 236; 224 MG/ML; MG/ML
15 INJECTION, SOLUTION INTRAVENOUS ONCE
Qty: 0 | Refills: 0 | Status: COMPLETED | OUTPATIENT
Start: 2017-12-07 | End: 2017-12-07

## 2017-12-07 RX ORDER — POTASSIUM PHOSPHATE, MONOBASIC POTASSIUM PHOSPHATE, DIBASIC 236; 224 MG/ML; MG/ML
30 INJECTION, SOLUTION INTRAVENOUS ONCE
Qty: 0 | Refills: 0 | Status: DISCONTINUED | OUTPATIENT
Start: 2017-12-07 | End: 2017-12-07

## 2017-12-07 RX ADMIN — ENOXAPARIN SODIUM 40 MILLIGRAM(S): 100 INJECTION SUBCUTANEOUS at 13:21

## 2017-12-07 RX ADMIN — Medication 50 MILLIGRAM(S): at 18:19

## 2017-12-07 RX ADMIN — Medication 1.25 MILLIGRAM(S): at 18:16

## 2017-12-07 RX ADMIN — Medication 400 MILLIGRAM(S): at 18:29

## 2017-12-07 RX ADMIN — HYDROMORPHONE HYDROCHLORIDE 30 MILLILITER(S): 2 INJECTION INTRAMUSCULAR; INTRAVENOUS; SUBCUTANEOUS at 08:25

## 2017-12-07 RX ADMIN — Medication 400 MILLIGRAM(S): at 07:09

## 2017-12-07 RX ADMIN — PANTOPRAZOLE SODIUM 40 MILLIGRAM(S): 20 TABLET, DELAYED RELEASE ORAL at 13:21

## 2017-12-07 RX ADMIN — Medication 100 MILLIGRAM(S): at 22:19

## 2017-12-07 RX ADMIN — Medication 100 MILLIGRAM(S): at 15:24

## 2017-12-07 RX ADMIN — Medication 1.25 MILLIGRAM(S): at 06:20

## 2017-12-07 RX ADMIN — POTASSIUM PHOSPHATE, MONOBASIC POTASSIUM PHOSPHATE, DIBASIC 62.5 MILLIMOLE(S): 236; 224 INJECTION, SOLUTION INTRAVENOUS at 22:46

## 2017-12-07 RX ADMIN — Medication 1.25 MILLIGRAM(S): at 13:21

## 2017-12-07 RX ADMIN — Medication 50 MILLIGRAM(S): at 06:20

## 2017-12-07 RX ADMIN — Medication 100 MILLIGRAM(S): at 06:20

## 2017-12-07 RX ADMIN — POTASSIUM PHOSPHATE, MONOBASIC POTASSIUM PHOSPHATE, DIBASIC 62.5 MILLIMOLE(S): 236; 224 INJECTION, SOLUTION INTRAVENOUS at 15:00

## 2017-12-07 RX ADMIN — Medication 1.25 MILLIGRAM(S): at 00:58

## 2017-12-07 RX ADMIN — DEXTROSE MONOHYDRATE, SODIUM CHLORIDE, AND POTASSIUM CHLORIDE 75 MILLILITER(S): 50; .745; 4.5 INJECTION, SOLUTION INTRAVENOUS at 18:29

## 2017-12-07 NOTE — PROGRESS NOTE ADULT - SUBJECTIVE AND OBJECTIVE BOX
Day _3__ of Anesthesia Pain Management Service    Allergies    No Known Allergies    Intolerances        SUBJECTIVE: "I am doing ok, no pain really"    Pain Scale Score	At rest: __2_ 	With Activity: ___ 	[ ] Refer to charted pain scores    THERAPY:    [ ] IV PCA Morphine		[ ] 5 mg/mL	[ ] 1 mg/mL  [X] IV PCA Hydromorphone	[ ] 5 mg/mL	[X] 1 mg/mL  [ ] IV PCA Fentanyl		[ ] 50 micrograms/mL    Demand dose _0.2mg_ lockout _6_ (minutes) Continuous Rate _0_ Total: ___  Daily      MEDICATIONS  (STANDING):  aztreonam  IVPB      aztreonam  IVPB 1000 milliGRAM(s) IV Intermittent every 12 hours  clindamycin IVPB      clindamycin IVPB 900 milliGRAM(s) IV Intermittent once  clindamycin IVPB 900 milliGRAM(s) IV Intermittent every 8 hours  dextrose 5% + sodium chloride 0.45% with potassium chloride 20 mEq/L 1000 milliLiter(s) (125 mL/Hr) IV Continuous <Continuous>  enalaprilat Injectable 1.25 milliGRAM(s) IV Push every 6 hours  enoxaparin Injectable 40 milliGRAM(s) SubCutaneous daily  HYDROmorphone PCA (1 mG/mL) 30 milliLiter(s) PCA Continuous PCA Continuous  pantoprazole  Injectable 40 milliGRAM(s) IV Push daily  potassium phosphate IVPB 15 milliMole(s) IV Intermittent once  potassium phosphate IVPB 15 milliMole(s) IV Intermittent once    MEDICATIONS  (PRN):  HYDROmorphone PCA (1 mG/mL) Rescue Clinician Bolus 0.5 milliGRAM(s) IV Push every 15 minutes PRN for Pain Scale GREATER THAN 6  naloxone Injectable 0.1 milliGRAM(s) IV Push every 3 minutes PRN For ANY of the following changes in patient status:  A. RR LESS THAN 10 breaths per minute, B. Oxygen saturation LESS THAN 90%, C. Sedation score of 6  ondansetron Injectable 4 milliGRAM(s) IV Push every 6 hours PRN Nausea      OBJECTIVE: A&Ox3, NAD, supine in bed    Sedation Score:	[X] Alert	[ ] Drowsy	[ ] Arousable	[ ] Asleep	[ ] Unresponsive    Side Effects:	[X] None	[ ] Nausea	[ ] Vomiting	[ ] Pruritus  		  [ ] Weakness		[ ] Numbness	[ ] Other:                              11.8   9.60  )-----------( 236      ( 07 Dec 2017 05:45 )             37.1       12-07    141  |  103  |  13  ----------------------------<  131<H>  3.4<L>   |  28  |  0.67    Ca    9.0      07 Dec 2017 05:45  Phos  1.8     12-07  Mg     2.3     12-07        ASSESSMENT/ PLAN    Therapy to  be:	[X] Continue   [ ] Discontinued   [ ] Change to prn Analgesics    Documentation and Verification of current medications:  [X] Done	[ ] Not done, not eligible  [ ] Not done, reason not given    [ ]  [place]  Reviewed and Copied to Chart    Comments: Pt. NPO continue current therapy

## 2017-12-07 NOTE — PROGRESS NOTE ADULT - SUBJECTIVE AND OBJECTIVE BOX
B Team (General Surgery) Daily Progress Note    SUBJECTIVE:  Pt seen and examined, and is resting comfortably in bed. She was febrile to 38.8 overnight. She was SOB overnight and received CPAP, which relieved her symptoms.  Pain is well controlled with medications.  She is not passing flatus or bowel movements.    OBJECTIVE:  T(C): 37.5 (12-07-17 @ 09:56), Max: 38.8 (12-07-17 @ 02:24)  HR: 86 (12-07-17 @ 09:56) (86 - 103)  BP: 131/84 (12-07-17 @ 09:56) (131/84 - 155/98)  RR: 18 (12-07-17 @ 09:56) (18 - 22)  SpO2: 97% (12-07-17 @ 09:56) (90% - 97%)  Wt(kg): --    12-06 @ 07:01  -  12-07 @ 07:00  --------------------------------------------------------  IN:    dextrose 5% + sodium chloride 0.45% with potassium chloride 20 mEq/L: 1750 mL    IV PiggyBack: 50 mL    lactated ringers.: 625 mL  Total IN: 2425 mL    OUT:    Bulb: 75.5 mL    Indwelling Catheter - Urethral: 1775 mL    Nasoenteral Tube: 450 mL  Total OUT: 2300.5 mL    Total NET: 124.5 mL      12-07 @ 07:01  -  12-07 @ 13:01  --------------------------------------------------------  IN:  Total IN: 0 mL    OUT:    Bulb: 7 mL    Indwelling Catheter - Urethral: 200 mL  Total OUT: 207 mL    Total NET: -207 mL      Exam:  GEN: A&Ox3, NAD  HEENT: atraumatic, normocephalic  CV: no JVD  RESP: no increased work of breathing, no use of accessory muscles  GI/ABD: soft, appropriately tender, mildly distended, no rebound or guarding, incision is c/d/i, JPx1 are serosanguinous, NGT to suction draining bilious fluid  EXTREMITIES: warm, pink, well-perfused                                   11.8   9.60  )-----------( 236      ( 07 Dec 2017 05:45 )             37.1     12-07    141  |  103  |  13  ----------------------------<  131<H>  3.4<L>   |  28  |  0.67    Ca    9.0      07 Dec 2017 05:45  Phos  1.8     12-07  Mg     2.3     12-07        ASSESSMENT:  47 year old female with a history of ex-lap L ovarian cystectomy presents with 3 days of abdominal pain, nausea/vomiting, CT scan showed high-grade SBO with transition point at the ventral hernia s/p primary ventral hernia repair and small bowel resection.     PLAN:    - Diet: NPO/NGT  - Continue Protonix  - trend fevers, will work up if continued   - monitor R PASCUAL output  - d/c boyle, f/u TOV  - encourage OOB/ambulation  - dvt ppx    Angelia Wolf, PGY1  [id number]

## 2017-12-07 NOTE — PROGRESS NOTE ADULT - SUBJECTIVE AND OBJECTIVE BOX
Anesthesia Pain Management Service- Attending Addendum    SUBJECTIVE: Pt doing well with IV PCA without problems reported.    Therapy:	  [ X] IV PCA	   [ ] Epidural           [ ] s/p Spinal Opoid              [ ] Postpartum infusion	  [ ] Patient controlled regional anesthesia (PCRA)    [ ] prn Analgesics    Allergies    No Known Allergies    Intolerances      MEDICATIONS  (STANDING):  aztreonam  IVPB      aztreonam  IVPB 1000 milliGRAM(s) IV Intermittent every 12 hours  clindamycin IVPB      clindamycin IVPB 900 milliGRAM(s) IV Intermittent once  clindamycin IVPB 900 milliGRAM(s) IV Intermittent every 8 hours  dextrose 5% + sodium chloride 0.45% with potassium chloride 20 mEq/L 1000 milliLiter(s) (75 mL/Hr) IV Continuous <Continuous>  enalaprilat Injectable 1.25 milliGRAM(s) IV Push every 6 hours  enoxaparin Injectable 40 milliGRAM(s) SubCutaneous daily  HYDROmorphone PCA (1 mG/mL) 30 milliLiter(s) PCA Continuous PCA Continuous  pantoprazole  Injectable 40 milliGRAM(s) IV Push daily  potassium phosphate IVPB 15 milliMole(s) IV Intermittent once  potassium phosphate IVPB 15 milliMole(s) IV Intermittent once    MEDICATIONS  (PRN):  HYDROmorphone PCA (1 mG/mL) Rescue Clinician Bolus 0.5 milliGRAM(s) IV Push every 15 minutes PRN for Pain Scale GREATER THAN 6  naloxone Injectable 0.1 milliGRAM(s) IV Push every 3 minutes PRN For ANY of the following changes in patient status:  A. RR LESS THAN 10 breaths per minute, B. Oxygen saturation LESS THAN 90%, C. Sedation score of 6  ondansetron Injectable 4 milliGRAM(s) IV Push every 6 hours PRN Nausea      OBJECTIVE:   [X] No new signs     [ ] Other:    Side Effects:  [X ] None			[ ] Other:    Assessment of Catheter Site:		[ ] Intact		[ ] Other:    ASSESSMENT/PLAN  [ X] Continue current therapy    [ ] Therapy changed to:    [ ] IV PCA       [ ] Epidural     [ ] prn Analgesics     Comments:

## 2017-12-08 LAB
APPEARANCE UR: SIGNIFICANT CHANGE UP
BACTERIA # UR AUTO: SIGNIFICANT CHANGE UP
BILIRUB UR-MCNC: SIGNIFICANT CHANGE UP
BLOOD UR QL VISUAL: NEGATIVE — SIGNIFICANT CHANGE UP
BUN SERPL-MCNC: 10 MG/DL — SIGNIFICANT CHANGE UP (ref 7–23)
BUN SERPL-MCNC: 8 MG/DL — SIGNIFICANT CHANGE UP (ref 7–23)
CALCIUM SERPL-MCNC: 9.1 MG/DL — SIGNIFICANT CHANGE UP (ref 8.4–10.5)
CALCIUM SERPL-MCNC: 9.2 MG/DL — SIGNIFICANT CHANGE UP (ref 8.4–10.5)
CHLORIDE SERPL-SCNC: 101 MMOL/L — SIGNIFICANT CHANGE UP (ref 98–107)
CHLORIDE SERPL-SCNC: 108 MMOL/L — HIGH (ref 98–107)
CO2 SERPL-SCNC: 23 MMOL/L — SIGNIFICANT CHANGE UP (ref 22–31)
CO2 SERPL-SCNC: 24 MMOL/L — SIGNIFICANT CHANGE UP (ref 22–31)
COLOR SPEC: YELLOW — SIGNIFICANT CHANGE UP
CREAT SERPL-MCNC: 0.57 MG/DL — SIGNIFICANT CHANGE UP (ref 0.5–1.3)
CREAT SERPL-MCNC: 0.59 MG/DL — SIGNIFICANT CHANGE UP (ref 0.5–1.3)
GLUCOSE SERPL-MCNC: 106 MG/DL — HIGH (ref 70–99)
GLUCOSE SERPL-MCNC: 97 MG/DL — SIGNIFICANT CHANGE UP (ref 70–99)
GLUCOSE UR-MCNC: NEGATIVE — SIGNIFICANT CHANGE UP
HCT VFR BLD CALC: 36.8 % — SIGNIFICANT CHANGE UP (ref 34.5–45)
HGB BLD-MCNC: 11.7 G/DL — SIGNIFICANT CHANGE UP (ref 11.5–15.5)
KETONES UR-MCNC: SIGNIFICANT CHANGE UP
LEUKOCYTE ESTERASE UR-ACNC: NEGATIVE — SIGNIFICANT CHANGE UP
MAGNESIUM SERPL-MCNC: 2.2 MG/DL — SIGNIFICANT CHANGE UP (ref 1.6–2.6)
MCHC RBC-ENTMCNC: 27.9 PG — SIGNIFICANT CHANGE UP (ref 27–34)
MCHC RBC-ENTMCNC: 31.8 % — LOW (ref 32–36)
MCV RBC AUTO: 87.6 FL — SIGNIFICANT CHANGE UP (ref 80–100)
MUCOUS THREADS # UR AUTO: SIGNIFICANT CHANGE UP
NITRITE UR-MCNC: NEGATIVE — SIGNIFICANT CHANGE UP
NON-SQ EPI CELLS # UR AUTO: 1 — SIGNIFICANT CHANGE UP
NRBC # FLD: 0 — SIGNIFICANT CHANGE UP
NT-PROBNP SERPL-SCNC: 2774 PG/ML — SIGNIFICANT CHANGE UP
PH UR: 8.5 — HIGH (ref 4.6–8)
PHOSPHATE SERPL-MCNC: 2.4 MG/DL — LOW (ref 2.5–4.5)
PLATELET # BLD AUTO: 216 K/UL — SIGNIFICANT CHANGE UP (ref 150–400)
PMV BLD: 11.6 FL — SIGNIFICANT CHANGE UP (ref 7–13)
POTASSIUM SERPL-MCNC: 3.3 MMOL/L — LOW (ref 3.5–5.3)
POTASSIUM SERPL-MCNC: 3.6 MMOL/L — SIGNIFICANT CHANGE UP (ref 3.5–5.3)
POTASSIUM SERPL-SCNC: 3.3 MMOL/L — LOW (ref 3.5–5.3)
POTASSIUM SERPL-SCNC: 3.6 MMOL/L — SIGNIFICANT CHANGE UP (ref 3.5–5.3)
PROT UR-MCNC: 100 MG/DL — HIGH
RBC # BLD: 4.2 M/UL — SIGNIFICANT CHANGE UP (ref 3.8–5.2)
RBC # FLD: 14.7 % — HIGH (ref 10.3–14.5)
RBC CASTS # UR COMP ASSIST: HIGH (ref 0–?)
SODIUM SERPL-SCNC: 140 MMOL/L — SIGNIFICANT CHANGE UP (ref 135–145)
SODIUM SERPL-SCNC: 144 MMOL/L — SIGNIFICANT CHANGE UP (ref 135–145)
SP GR SPEC: 1.02 — SIGNIFICANT CHANGE UP (ref 1–1.04)
SPECIMEN SOURCE: SIGNIFICANT CHANGE UP
SPECIMEN SOURCE: SIGNIFICANT CHANGE UP
SQUAMOUS # UR AUTO: SIGNIFICANT CHANGE UP
UROBILINOGEN FLD QL: 4 MG/DL — HIGH
WBC # BLD: 8.59 K/UL — SIGNIFICANT CHANGE UP (ref 3.8–10.5)
WBC # FLD AUTO: 8.59 K/UL — SIGNIFICANT CHANGE UP (ref 3.8–10.5)
WBC CLUMPS #/AREA URNS HPF: PRESENT — HIGH (ref 0–?)
WBC UR QL: HIGH (ref 0–?)

## 2017-12-08 PROCEDURE — 93970 EXTREMITY STUDY: CPT | Mod: 26

## 2017-12-08 PROCEDURE — 71010: CPT | Mod: 26

## 2017-12-08 RX ORDER — AMLODIPINE BESYLATE 2.5 MG/1
10 TABLET ORAL
Qty: 0 | Refills: 0 | Status: DISCONTINUED | OUTPATIENT
Start: 2017-12-08 | End: 2017-12-10

## 2017-12-08 RX ORDER — PANTOPRAZOLE SODIUM 20 MG/1
40 TABLET, DELAYED RELEASE ORAL
Qty: 0 | Refills: 0 | Status: DISCONTINUED | OUTPATIENT
Start: 2017-12-09 | End: 2017-12-10

## 2017-12-08 RX ORDER — DEXTROSE MONOHYDRATE, SODIUM CHLORIDE, AND POTASSIUM CHLORIDE 50; .745; 4.5 G/1000ML; G/1000ML; G/1000ML
1000 INJECTION, SOLUTION INTRAVENOUS
Qty: 0 | Refills: 0 | Status: DISCONTINUED | OUTPATIENT
Start: 2017-12-08 | End: 2017-12-09

## 2017-12-08 RX ORDER — POTASSIUM CHLORIDE 20 MEQ
10 PACKET (EA) ORAL
Qty: 0 | Refills: 0 | Status: COMPLETED | OUTPATIENT
Start: 2017-12-08 | End: 2017-12-08

## 2017-12-08 RX ORDER — ENOXAPARIN SODIUM 100 MG/ML
40 INJECTION SUBCUTANEOUS
Qty: 0 | Refills: 0 | Status: DISCONTINUED | OUTPATIENT
Start: 2017-12-09 | End: 2017-12-10

## 2017-12-08 RX ORDER — POTASSIUM PHOSPHATE, MONOBASIC POTASSIUM PHOSPHATE, DIBASIC 236; 224 MG/ML; MG/ML
15 INJECTION, SOLUTION INTRAVENOUS ONCE
Qty: 0 | Refills: 0 | Status: COMPLETED | OUTPATIENT
Start: 2017-12-08 | End: 2017-12-08

## 2017-12-08 RX ORDER — FUROSEMIDE 40 MG
20 TABLET ORAL ONCE
Qty: 0 | Refills: 0 | Status: COMPLETED | OUTPATIENT
Start: 2017-12-08 | End: 2017-12-08

## 2017-12-08 RX ORDER — POTASSIUM CHLORIDE 20 MEQ
10 PACKET (EA) ORAL
Qty: 0 | Refills: 0 | Status: DISCONTINUED | OUTPATIENT
Start: 2017-12-08 | End: 2017-12-08

## 2017-12-08 RX ORDER — ACETAMINOPHEN 500 MG
1000 TABLET ORAL ONCE
Qty: 0 | Refills: 0 | Status: COMPLETED | OUTPATIENT
Start: 2017-12-08 | End: 2017-12-08

## 2017-12-08 RX ORDER — IPRATROPIUM/ALBUTEROL SULFATE 18-103MCG
3 AEROSOL WITH ADAPTER (GRAM) INHALATION EVERY 6 HOURS
Qty: 0 | Refills: 0 | Status: DISCONTINUED | OUTPATIENT
Start: 2017-12-08 | End: 2017-12-10

## 2017-12-08 RX ORDER — DEXTROSE MONOHYDRATE, SODIUM CHLORIDE, AND POTASSIUM CHLORIDE 50; .745; 4.5 G/1000ML; G/1000ML; G/1000ML
1000 INJECTION, SOLUTION INTRAVENOUS
Qty: 0 | Refills: 0 | Status: DISCONTINUED | OUTPATIENT
Start: 2017-12-08 | End: 2017-12-08

## 2017-12-08 RX ADMIN — Medication 400 MILLIGRAM(S): at 18:19

## 2017-12-08 RX ADMIN — Medication 50 MILLIGRAM(S): at 06:19

## 2017-12-08 RX ADMIN — Medication 1000 MILLIGRAM(S): at 18:52

## 2017-12-08 RX ADMIN — DEXTROSE MONOHYDRATE, SODIUM CHLORIDE, AND POTASSIUM CHLORIDE 50 MILLILITER(S): 50; .745; 4.5 INJECTION, SOLUTION INTRAVENOUS at 18:19

## 2017-12-08 RX ADMIN — Medication 400 MILLIGRAM(S): at 23:16

## 2017-12-08 RX ADMIN — POTASSIUM PHOSPHATE, MONOBASIC POTASSIUM PHOSPHATE, DIBASIC 62.5 MILLIMOLE(S): 236; 224 INJECTION, SOLUTION INTRAVENOUS at 13:05

## 2017-12-08 RX ADMIN — Medication 100 MILLIEQUIVALENT(S): at 23:17

## 2017-12-08 RX ADMIN — Medication 100 MILLIEQUIVALENT(S): at 21:53

## 2017-12-08 RX ADMIN — Medication 100 MILLIGRAM(S): at 06:17

## 2017-12-08 RX ADMIN — HYDROMORPHONE HYDROCHLORIDE 30 MILLILITER(S): 2 INJECTION INTRAMUSCULAR; INTRAVENOUS; SUBCUTANEOUS at 21:13

## 2017-12-08 RX ADMIN — HYDROMORPHONE HYDROCHLORIDE 30 MILLILITER(S): 2 INJECTION INTRAMUSCULAR; INTRAVENOUS; SUBCUTANEOUS at 08:46

## 2017-12-08 RX ADMIN — Medication 3 MILLILITER(S): at 21:35

## 2017-12-08 RX ADMIN — AMLODIPINE BESYLATE 10 MILLIGRAM(S): 2.5 TABLET ORAL at 18:20

## 2017-12-08 RX ADMIN — POTASSIUM PHOSPHATE, MONOBASIC POTASSIUM PHOSPHATE, DIBASIC 62.5 MILLIMOLE(S): 236; 224 INJECTION, SOLUTION INTRAVENOUS at 19:00

## 2017-12-08 RX ADMIN — Medication 5 MILLIGRAM(S): at 13:05

## 2017-12-08 RX ADMIN — Medication 20 MILLIGRAM(S): at 13:05

## 2017-12-08 RX ADMIN — Medication 2.5 MILLIGRAM(S): at 06:17

## 2017-12-08 RX ADMIN — Medication 3 MILLILITER(S): at 13:12

## 2017-12-08 RX ADMIN — Medication 2.5 MILLIGRAM(S): at 00:40

## 2017-12-08 NOTE — PROGRESS NOTE ADULT - SUBJECTIVE AND OBJECTIVE BOX
Day _4__ of Anesthesia Pain Management Service    Allergies    No Known Allergies    Intolerances        SUBJECTIVE: "I'm doing fine, no pain"    Pain Scale Score	At rest: __0_ 	With Activity: ___ 	[ ] Refer to charted pain scores    THERAPY:    [ ] IV PCA Morphine		[ ] 5 mg/mL	[ ] 1 mg/mL  [X] IV PCA Hydromorphone	[ ] 5 mg/mL	[X] 1 mg/mL  [ ] IV PCA Fentanyl		[ ] 50 micrograms/mL    Demand dose _0.2mg_ lockout _6_ (minutes) Continuous Rate _0_ Total: __0mg_  Daily      MEDICATIONS  (STANDING):  dextrose 5% + sodium chloride 0.45% with potassium chloride 20 mEq/L 1000 milliLiter(s) (10 mL/Hr) IV Continuous <Continuous>  HYDROmorphone PCA (1 mG/mL) 30 milliLiter(s) PCA Continuous PCA Continuous  pantoprazole  Injectable 40 milliGRAM(s) IV Push daily    MEDICATIONS  (PRN):  HYDROmorphone PCA (1 mG/mL) Rescue Clinician Bolus 0.5 milliGRAM(s) IV Push every 15 minutes PRN for Pain Scale GREATER THAN 6  naloxone Injectable 0.1 milliGRAM(s) IV Push every 3 minutes PRN For ANY of the following changes in patient status:  A. RR LESS THAN 10 breaths per minute, B. Oxygen saturation LESS THAN 90%, C. Sedation score of 6  ondansetron Injectable 4 milliGRAM(s) IV Push every 6 hours PRN Nausea      OBJECTIVE: A&Ox3, NAD, supine in bed    Sedation Score:	[X] Alert	[ ] Drowsy	[ ] Arousable	[ ] Asleep	[ ] Unresponsive    Side Effects:	[X] None	[ ] Nausea	[ ] Vomiting	[ ] Pruritus  		  [ ] Weakness		[ ] Numbness	[ ] Other:                              11.7   8.59  )-----------( 216      ( 08 Dec 2017 06:22 )             36.8       12-08    144  |  108<H>  |  10  ----------------------------<  106<H>  3.6   |  24  |  0.59    Ca    9.1      08 Dec 2017 06:22  Phos  2.4     12-08  Mg     2.2     12-08        ASSESSMENT/ PLAN    Therapy to  be:	[X] Continue   [ ] Discontinued   [ ] Change to prn Analgesics    Documentation and Verification of current medications:  [X] Done	[ ] Not done, not eligible  [ ] Not done, reason not given    [ ]  [place]  Reviewed and Copied to Chart    Comments: NPO continue current therapy

## 2017-12-08 NOTE — PROGRESS NOTE ADULT - SUBJECTIVE AND OBJECTIVE BOX
Anesthesia Pain Management Service    SUBJECTIVE: Pt doing well with IV PCA without problems reported.    Therapy:	  [ X] IV PCA	   [ ] Epidural           [ ] s/p Spinal Opoid              [ ] Postpartum infusion	  [ ] Patient controlled regional anesthesia (PCRA)    [ ] prn Analgesics    Allergies    No Known Allergies    Intolerances      MEDICATIONS  (STANDING):  ALBUTerol/ipratropium for Nebulization 3 milliLiter(s) Nebulizer every 6 hours  amLODIPine   Tablet 10 milliGRAM(s) Oral <User Schedule>  dextrose 5% + sodium chloride 0.45% with potassium chloride 20 mEq/L 1000 milliLiter(s) (50 mL/Hr) IV Continuous <Continuous>  enalapril 5 milliGRAM(s) Oral <User Schedule>  HYDROmorphone PCA (1 mG/mL) 30 milliLiter(s) PCA Continuous PCA Continuous    MEDICATIONS  (PRN):  HYDROmorphone PCA (1 mG/mL) Rescue Clinician Bolus 0.5 milliGRAM(s) IV Push every 15 minutes PRN for Pain Scale GREATER THAN 6  naloxone Injectable 0.1 milliGRAM(s) IV Push every 3 minutes PRN For ANY of the following changes in patient status:  A. RR LESS THAN 10 breaths per minute, B. Oxygen saturation LESS THAN 90%, C. Sedation score of 6      OBJECTIVE:   [X] No new signs     [ ] Other:    Side Effects:  [X ] None			[ ] Other:    Assessment of Catheter Site:		[ ] Intact		[ ] Other:    ASSESSMENT/PLAN  [ X] Continue current therapy    [ ] Therapy changed to:    [ ] IV PCA       [ ] Epidural     [ ] prn Analgesics     Comments:    Progress Note written now but Patient was seen earlier.

## 2017-12-08 NOTE — PROGRESS NOTE ADULT - SUBJECTIVE AND OBJECTIVE BOX
B Team (General Surgery) Daily Progress Note    SUBJECTIVE:  Pt seen and examined, and is resting comfortably in bed. She was febrile to 38.2 overnight. Pain is well controlled.  She is passing flatus but not having bowel movements yet.    OBJECTIVE:  T(C): 37.9 (17 @ 10:34), Max: 38.2 (17 @ 22:09)  HR: 92 (17 @ 12:55) (90 - 117)  BP: 157/97 (17 @ 10:34) (150/95 - 183/112)  RR: 18 (17 @ 10:34) (18 - 18)  SpO2: 94% (17 @ 12:55) (94% - 96%)  Wt(kg): --     @ 07:01  -   @ 07:00  --------------------------------------------------------  IN:    dextrose 5% + sodium chloride 0.45% with potassium chloride 20 mEq/L: 450 mL    IV PiggyBack: 300 mL  Total IN: 750 mL    OUT:    Bulb: 52 mL    Indwelling Catheter - Urethral: 200 mL    Nasoenteral Tube: 50 mL    Voided: 1325 mL  Total OUT: 1627 mL    Total NET: -877 mL       @ 07:01  -   @ 15:34  --------------------------------------------------------  IN:  Total IN: 0 mL    OUT:    Bulb: 5 mL    Voided: 800 mL  Total OUT: 805 mL    Total NET: -805 mL      Exam:  GEN: A&Ox3, NAD  RESP: non labored breathing  GI/ABD: soft, appropriately tender, mildly distended, no rebound or guarding, incision is c/d/i, JPx1 are serosanguinous  EXTREMITIES: warm, pink, well-perfused                        11.7   8.59  )-----------( 216      ( 08 Dec 2017 06:22 )             36.8     12-08    144  |  108<H>  |  10  ----------------------------<  106<H>  3.6   |  24  |  0.59    Ca    9.1      08 Dec 2017 06:22  Phos  2.4     12-08  Mg     2.2     12-08          Urinalysis Basic - ( 08 Dec 2017 00:08 )    Color: YELLOW / Appearance: HAZY / S.025 / pH: 8.5  Gluc: NEGATIVE / Ketone: TRACE  / Bili: SMALL / Urobili: 4 mg/dL   Blood: NEGATIVE / Protein: 100 mg/dL / Nitrite: NEGATIVE   Leuk Esterase: NEGATIVE / RBC: 5-10 / WBC 5-10   Sq Epi: OCC / Non Sq Epi: x / Bacteria: MOD                     ASSESSMENT:  47 year old female with a history of ex-lap L ovarian cystectomy presents with 3 days of abdominal pain, nausea/vomiting, CT scan showed high-grade SBO with transition point at the ventral hernia s/p primary ventral hernia repair and small bowel resection.     PLAN:    - Diet: NPO with sips, advance to clears later  - Continue Protonix  - trend fevers, will work up if continued   - monitor R PASCUAL output  - CPAP overnight   - encourage OOB/ambulation  - dvt ppx    Angelia Wolf, PGY1  [phone]

## 2017-12-08 NOTE — CHART NOTE - NSCHARTNOTEFT_GEN_A_CORE
December 8th, 2017  10:30 AM    Hospital Day #3  Post op Day #3    This patient was seen and evaluated on daily B-Team rounds. Care discussed at B-Team morning report sign-out.    BP		=	131 – 183/84 - 112  P		=	86 - 117	O2 Sat	=	94% - 97%  R		=	18 - 22		I/O	=	750 in/1,630 out  Temp		=	36.8 – 38.2			+ 1.7 l since admit (excluding OR)  							NGT	=	50 ml  Glucose	=	158 – 230			R-PASCUAL	=	50 ml    Weight		=	100.4 Kg  BMI		=	36.8            WBC	=	9	Na		=	144  Neutro	=	-	K		=	3.6  Hgb	=	12	Cl		=	108  Hct	=	37%	HCO3		=	24  Plts	=	216	Glucose	=	106  			BUN		=	10  PT	=	-	Creat		=	0.6  PTT	=	-  INR	=	-    Chest radiograph 12/7 – some right sided atelectasis and volume overload (pulmonary edema). NGT in place.    Peritoneal fluid 12/5	-	E coli that was pan sensitive  			-	Coagulase negative staph.  Peritoneal fluid 12/5	-	Yeast - negative    Remains on:    1)	NPO except medications with sips / chips  2)	D5½NS + 20 KCl/liter @ 10 ml/hour  3)	Dilaudid IV-PCA  4)	Protonix 40 mg IVP q24 hours  5)	Enoxaparin 40 mg sq q24 hours  6)	Vasotec 2.5 mg IVP q6 hours  7)	Zofran 4 mg IVP q6 hours prn  8)	Narcan prn    Assessment:	This patient is assessed as being a morbidly obese (BMI = 36.8), hypertensive female who has had symptoms of gastro-esophageal reflux disease and left lower extremity cellulitis who presented to the ED at [facility] at approximately 4:40 PM on 12/4/17 when she was referred from a physician’s clinic / office where she was found to have abdominal pain, nausea, emesis, and orthostatic hypotension. The pain began 3 days prior to hospitalization and progressively worsened. In the ED she was normotensive and did not have fever or tachycardia. She had a soft, obese abdomen with mild tenderness and she had an incarcerated ventral / incisional hernia. A CT scan revealed the presence of a high-grade small bowel obstruction at the level of the hernia prompting surgery. She underwent a laparotomy, small bowel resection with a primary anastomosis and primary repair of her ventral hernia (no mesh).    Post operatively she has had dyspnea and hypoxemia. She was given supplemental oxygen via a nasal cannula and subsequently a face tent. As she had oxygen desaturations mostly while sleeping, and she had a high STOP-BANG score it was thought that she likely had obstructive sleep apnea. She has since been given nocturnal BIPAP. A subcutaneous drain partially fell out and was removed. Her post-operative pain was ameliorated with IV-PCA narcotics and as there was enteric spillage into the peritoneum during the laparotomy she was treated with parenteral antibiotics. She had an NGT providing gastric decompression but when she recently passed flatus the NGT was removed. Intermittently she has had fevers but her WBC is normal and she does not appear toxic.    Plan to:    1)	Clarify orders.  2)	Advance diet as tolerated. Now that the NGT is out can be given medications  	orally. Will give Vasotec, amlodipine and Protonix po.  3)	Provide some parenteral fluid until she can tolerate an oral diet well. The  	parenteral fluid was lowered to KVO as she appeared to have volume overload  	(pulmonary edema) on a chest radiograph on 12/7. Will give some dextrose  	containing fluid and soon resume her diuretics (took HCTZ prior to hospitalization).  4)	Discontinue the Zofran.  5)	Culture fluids as she has had a fever.  6)	Allow and assist her to be out of bed.  7)	Give inhalational bronchodilators.  8)	Inform the patient’s internist that this patient has been hospitalized.  9)	If she continues to have either dyspnea or hypoxemia will consider obtaining a  	pulmonary medicine consultation.  10)	Full support in place.    Krzysztof Pantoja  40 Minutes exclusive of procedures December 8th, 2017  10:30 AM    Hospital Day #3  Post op Day #3    This patient was seen and evaluated on daily B-Team rounds. Care discussed at B-Team morning report sign-out.    BP		=	131 – 183/84 - 112  P		=	86 - 117	O2 Sat	=	94% - 97%  R		=	18 - 22		I/O	=	750 in/1,630 out  Temp		=	36.8 – 38.2			+ 1.7 l since admit (excluding OR)  							NGT	=	50 ml  Glucose	=	158 – 230			R-PASCUAL	=	50 ml    Weight		=	100.4 Kg  BMI		=	36.8    Awake, alert, and fully oriented. Appears diaphoretic.  Anicteric. Pupils reactive. Extra-occular movements intact.  No thrush. Normal oropharyngeal mucosa.  No JVD.  Lungs with labored respirations. Tachypneic. Symmetrical chest wall movements. No wheezing now. No rales.  COR - RRR. No murmur.  Abdomen obese. Appropriately tender. Midline lower abdominal wound OK. PASCUAL drain serous fluid.  Extremities with dependent edema and venous stasis type ulcer in the left lower extremity.    WBC	=	9	Na		=	144  Neutro	=	-	K		=	3.6  Hgb	=	12	Cl		=	108  Hct	=	37%	HCO3		=	24  Plts	=	216	Glucose	=	106  			BUN		=	10  PT	=	-	Creat		=	0.6  PTT	=	-  INR	=	-    Chest radiograph 12/7 – some right sided atelectasis and volume overload (pulmonary edema). NGT in place.    Peritoneal fluid 12/5	-	E coli that was pan sensitive  			-	Coagulase negative staph.  Peritoneal fluid 12/5	-	Yeast - negative    Remains on:    1)	NPO except medications with sips / chips  2)	D5½NS + 20 KCl/liter @ 10 ml/hour  3)	Dilaudid IV-PCA  4)	Protonix 40 mg IVP q24 hours  5)	Enoxaparin 40 mg sq q24 hours  6)	Vasotec 2.5 mg IVP q6 hours  7)	Zofran 4 mg IVP q6 hours prn  8)	Narcan prn  9)	Supplemental oxygen via a nasal cannula and nocturnal BIPAP.    Assessment:	This patient is assessed as being a morbidly obese (BMI = 36.8), hypertensive female who has had symptoms of gastro-esophageal reflux disease and left lower extremity cellulitis who presented to the ED at [facility] at approximately 4:40 PM on 12/4/17 when she was referred from a physician’s clinic / office where she was found to have abdominal pain, nausea, emesis, and orthostatic hypotension. The pain began 3 days prior to hospitalization and progressively worsened. In the ED she was normotensive and did not have fever or tachycardia. She had a soft, obese abdomen with mild tenderness and she had an incarcerated ventral / incisional hernia. A CT scan revealed the presence of a high-grade small bowel obstruction at the level of the hernia prompting surgery. She underwent a laparotomy, small bowel resection with a primary anastomosis and primary repair of her ventral hernia (no mesh).    Post operatively she has had dyspnea and hypoxemia. She was given supplemental oxygen via a nasal cannula and subsequently a face tent. As she had oxygen desaturations mostly while sleeping, and she had a high STOP-BANG score it was thought that she likely had obstructive sleep apnea. She has since been given nocturnal BIPAP. A subcutaneous drain partially fell out and was removed. Her post-operative pain was ameliorated with IV-PCA narcotics and as there was enteric spillage into the peritoneum during the laparotomy she was treated with parenteral antibiotics. She had an NGT providing gastric decompression but when she recently passed flatus the NGT was removed. Intermittently she has had fevers but her WBC is normal and she does not appear toxic.    Plan to:    1)	Clarify orders.  2)	Advance diet as tolerated. Now that the NGT is out can be given medications  	orally. Will give Vasotec, amlodipine and Protonix po.  3)	Check a pro-BNP and monitor oxygen saturations continuously.  4)	Provide some parenteral dextrose containing fluid until she can tolerate an  	oral diet well. The parenteral fluid was lowered to KVO as she appeared to  	have volume overload (pulmonary edema) on a chest radiograph on 12/7.  	Will give some dextrose containing fluid and soon resume her diuretics  	(took HCTZ prior to hospitalization). For now will give Lasix.  5)	Discontinue the Zofran.  6)	Culture fluids as she has had a fever.  7)	Allow and assist her to be out of bed.  8)	Give inhalational bronchodilators.  9)	Inform the patient’s internist that this patient has been hospitalized.  10)	If she continues to have either dyspnea or hypoxemia will consider obtaining a  	pulmonary medicine consultation. As her chest radiograph is not clear, and  	her respiratory abnormalities did not occur acutely (symptoms progressed  	on over time) will not plan to obtain a pulmonary angiogram.  11)	Full support in place.    Krzysztof Pantoja  40 Minutes exclusive of procedures

## 2017-12-09 LAB
ANISOCYTOSIS BLD QL: SLIGHT — SIGNIFICANT CHANGE UP
BACTERIA UR CULT: SIGNIFICANT CHANGE UP
BASOPHILS # BLD AUTO: 0.13 K/UL — SIGNIFICANT CHANGE UP (ref 0–0.2)
BASOPHILS NFR BLD AUTO: 1.2 % — SIGNIFICANT CHANGE UP (ref 0–2)
BASOPHILS NFR SPEC: 1.8 % — SIGNIFICANT CHANGE UP (ref 0–2)
BUN SERPL-MCNC: 12 MG/DL — SIGNIFICANT CHANGE UP (ref 7–23)
CALCIUM SERPL-MCNC: 9.4 MG/DL — SIGNIFICANT CHANGE UP (ref 8.4–10.5)
CHLORIDE SERPL-SCNC: 107 MMOL/L — SIGNIFICANT CHANGE UP (ref 98–107)
CO2 SERPL-SCNC: 23 MMOL/L — SIGNIFICANT CHANGE UP (ref 22–31)
CREAT SERPL-MCNC: 0.51 MG/DL — SIGNIFICANT CHANGE UP (ref 0.5–1.3)
EOSINOPHIL # BLD AUTO: 0.51 K/UL — HIGH (ref 0–0.5)
EOSINOPHIL NFR BLD AUTO: 4.6 % — SIGNIFICANT CHANGE UP (ref 0–6)
EOSINOPHIL NFR FLD: 9.8 % — HIGH (ref 0–6)
GIANT PLATELETS BLD QL SMEAR: PRESENT — SIGNIFICANT CHANGE UP
GLUCOSE SERPL-MCNC: 96 MG/DL — SIGNIFICANT CHANGE UP (ref 70–99)
HCT VFR BLD CALC: 37.9 % — SIGNIFICANT CHANGE UP (ref 34.5–45)
HGB BLD-MCNC: 12.1 G/DL — SIGNIFICANT CHANGE UP (ref 11.5–15.5)
IMM GRANULOCYTES # BLD AUTO: 0.51 # — SIGNIFICANT CHANGE UP
IMM GRANULOCYTES NFR BLD AUTO: 4.6 % — HIGH (ref 0–1.5)
LYMPHOCYTES # BLD AUTO: 1.88 K/UL — SIGNIFICANT CHANGE UP (ref 1–3.3)
LYMPHOCYTES # BLD AUTO: 16.8 % — SIGNIFICANT CHANGE UP (ref 13–44)
LYMPHOCYTES NFR SPEC AUTO: 5.3 % — LOW (ref 13–44)
MACROCYTES BLD QL: SLIGHT — SIGNIFICANT CHANGE UP
MCHC RBC-ENTMCNC: 27.9 PG — SIGNIFICANT CHANGE UP (ref 27–34)
MCHC RBC-ENTMCNC: 31.9 % — LOW (ref 32–36)
MCV RBC AUTO: 87.5 FL — SIGNIFICANT CHANGE UP (ref 80–100)
MONOCYTES # BLD AUTO: 1 K/UL — HIGH (ref 0–0.9)
MONOCYTES NFR BLD AUTO: 8.9 % — SIGNIFICANT CHANGE UP (ref 2–14)
MONOCYTES NFR BLD: 3.6 % — SIGNIFICANT CHANGE UP (ref 2–9)
MYELOCYTES NFR BLD: 0.9 % — HIGH (ref 0–0)
NEUTROPHIL AB SER-ACNC: 73.2 % — SIGNIFICANT CHANGE UP (ref 43–77)
NEUTROPHILS # BLD AUTO: 7.15 K/UL — SIGNIFICANT CHANGE UP (ref 1.8–7.4)
NEUTROPHILS NFR BLD AUTO: 63.9 % — SIGNIFICANT CHANGE UP (ref 43–77)
NEUTS BAND # BLD: 2.7 % — SIGNIFICANT CHANGE UP (ref 0–6)
NRBC # FLD: 0 — SIGNIFICANT CHANGE UP
NT-PROBNP SERPL-SCNC: 1831 PG/ML — SIGNIFICANT CHANGE UP
PLATELET # BLD AUTO: 238 K/UL — SIGNIFICANT CHANGE UP (ref 150–400)
PLATELET COUNT - ESTIMATE: NORMAL — SIGNIFICANT CHANGE UP
PMV BLD: 11.5 FL — SIGNIFICANT CHANGE UP (ref 7–13)
POLYCHROMASIA BLD QL SMEAR: SLIGHT — SIGNIFICANT CHANGE UP
POTASSIUM SERPL-MCNC: 3.8 MMOL/L — SIGNIFICANT CHANGE UP (ref 3.5–5.3)
POTASSIUM SERPL-SCNC: 3.8 MMOL/L — SIGNIFICANT CHANGE UP (ref 3.5–5.3)
RBC # BLD: 4.33 M/UL — SIGNIFICANT CHANGE UP (ref 3.8–5.2)
RBC # FLD: 14.9 % — HIGH (ref 10.3–14.5)
REVIEW TO FOLLOW: YES — SIGNIFICANT CHANGE UP
SODIUM SERPL-SCNC: 145 MMOL/L — SIGNIFICANT CHANGE UP (ref 135–145)
SPECIMEN SOURCE: SIGNIFICANT CHANGE UP
SPHEROCYTES BLD QL SMEAR: SLIGHT — SIGNIFICANT CHANGE UP
VARIANT LYMPHS # BLD: 2.7 % — SIGNIFICANT CHANGE UP
WBC # BLD: 11.18 K/UL — HIGH (ref 3.8–10.5)
WBC # FLD AUTO: 11.18 K/UL — HIGH (ref 3.8–10.5)

## 2017-12-09 PROCEDURE — 71010: CPT | Mod: 26,76

## 2017-12-09 RX ORDER — HYDROMORPHONE HYDROCHLORIDE 2 MG/ML
0.5 INJECTION INTRAMUSCULAR; INTRAVENOUS; SUBCUTANEOUS EVERY 4 HOURS
Qty: 0 | Refills: 0 | Status: DISCONTINUED | OUTPATIENT
Start: 2017-12-09 | End: 2017-12-10

## 2017-12-09 RX ORDER — POTASSIUM CHLORIDE 20 MEQ
20 PACKET (EA) ORAL ONCE
Qty: 0 | Refills: 0 | Status: COMPLETED | OUTPATIENT
Start: 2017-12-09 | End: 2017-12-09

## 2017-12-09 RX ADMIN — Medication 5 MILLIGRAM(S): at 08:31

## 2017-12-09 RX ADMIN — Medication 3 MILLILITER(S): at 10:25

## 2017-12-09 RX ADMIN — Medication 20 MILLIEQUIVALENT(S): at 11:42

## 2017-12-09 RX ADMIN — Medication 1000 MILLIGRAM(S): at 00:38

## 2017-12-09 RX ADMIN — Medication 3 MILLILITER(S): at 02:44

## 2017-12-09 RX ADMIN — Medication 3 MILLILITER(S): at 15:46

## 2017-12-09 RX ADMIN — ENOXAPARIN SODIUM 40 MILLIGRAM(S): 100 INJECTION SUBCUTANEOUS at 20:27

## 2017-12-09 RX ADMIN — DEXTROSE MONOHYDRATE, SODIUM CHLORIDE, AND POTASSIUM CHLORIDE 50 MILLILITER(S): 50; .745; 4.5 INJECTION, SOLUTION INTRAVENOUS at 07:27

## 2017-12-09 RX ADMIN — PANTOPRAZOLE SODIUM 40 MILLIGRAM(S): 20 TABLET, DELAYED RELEASE ORAL at 07:29

## 2017-12-09 RX ADMIN — AMLODIPINE BESYLATE 10 MILLIGRAM(S): 2.5 TABLET ORAL at 18:37

## 2017-12-09 RX ADMIN — Medication 100 MILLIEQUIVALENT(S): at 00:40

## 2017-12-09 RX ADMIN — HYDROMORPHONE HYDROCHLORIDE 30 MILLILITER(S): 2 INJECTION INTRAMUSCULAR; INTRAVENOUS; SUBCUTANEOUS at 07:24

## 2017-12-09 RX ADMIN — Medication 3 MILLILITER(S): at 22:58

## 2017-12-09 NOTE — PROGRESS NOTE ADULT - SUBJECTIVE AND OBJECTIVE BOX
Anesthesia Pain Management Service    SUBJECTIVE: Patient is doing well with IV PCA and no significant problems reported.    Pain Scale Score	At rest: ___ 	With Activity: ___ 	[X ] Refer to charted pain scores    THERAPY:    [ ] IV PCA Morphine		[ ] 5 mg/mL	[ ] 1 mg/mL  [X ] IV PCA Hydromorphone	[ ] 5 mg/mL	[X ] 1 mg/mL  [ ] IV PCA Fentanyl		[ ] 50 micrograms/mL    Demand dose __0.2_ lockout __6_ (minutes) Continuous Rate _0__ Total: ___0  Daily      MEDICATIONS  (STANDING):  ALBUTerol/ipratropium for Nebulization 3 milliLiter(s) Nebulizer every 6 hours  amLODIPine   Tablet 10 milliGRAM(s) Oral <User Schedule>  dextrose 5% + sodium chloride 0.45% with potassium chloride 20 mEq/L 1000 milliLiter(s) (50 mL/Hr) IV Continuous <Continuous>  enalapril 5 milliGRAM(s) Oral <User Schedule>  enoxaparin Injectable 40 milliGRAM(s) SubCutaneous <User Schedule>  pantoprazole    Tablet 40 milliGRAM(s) Oral before breakfast    MEDICATIONS  (PRN):      OBJECTIVE:    Sedation Score:	[ X] Alert	[ ] Drowsy 	[ ] Arousable	[ ] Asleep	[ ] Unresponsive    Side Effects:	[X ] None	[ ] Nausea	[ ] Vomiting	[ ] Pruritus  		[ ] Other:    Vital Signs Last 24 Hrs  T(C): 36.7 (09 Dec 2017 07:30), Max: 38.2 (08 Dec 2017 17:28)  T(F): 98 (09 Dec 2017 07:30), Max: 100.8 (08 Dec 2017 17:28)  HR: 92 (09 Dec 2017 07:30) (70 - 110)  BP: 162/108 (09 Dec 2017 07:30) (149/97 - 170/100)  BP(mean): --  RR: 20 (09 Dec 2017 07:30) (18 - 20)  SpO2: 97% (09 Dec 2017 07:30) (92% - 97%)    ASSESSMENT/ PLAN    Therapy to  be:	[ ] Continue   [ X] Discontinued   [X ] Change to prn Analgesics    Documentation and Verification of current medications:   [X] Done	[ ] Not done, not elligible    Comments: PRN Oral/IV opioids and/or Adjuvant medication to be ordered at this point.

## 2017-12-09 NOTE — PROGRESS NOTE ADULT - SUBJECTIVE AND OBJECTIVE BOX
B Team (General Surgery) Daily Progress Note    SUBJECTIVE:  Pt seen and examined, and is resting comfortably in bed. No acute events overnight. Pain is adequately controlled on current regimen. Pt has no complaints at this time. Positive for flatus and negative for BM.     OBJECTIVE:  Vital Signs Last 24 Hrs  T(C): 36.7 (09 Dec 2017 09:38), Max: 38.2 (08 Dec 2017 17:28)  T(F): 98.1 (09 Dec 2017 09:38), Max: 100.8 (08 Dec 2017 17:28)  HR: 87 (09 Dec 2017 09:38) (70 - 110)  BP: 154/94 (09 Dec 2017 09:38) (149/97 - 170/100)  BP(mean): --  RR: 18 (09 Dec 2017 09:38) (18 - 20)  SpO2: 94% (09 Dec 2017 09:38) (92% - 97%)    I&O's Detail    08 Dec 2017 07:01  -  09 Dec 2017 07:00  --------------------------------------------------------  IN:    IV PiggyBack: 550 mL  Total IN: 550 mL    OUT:    Bulb: 27.5 mL    Voided: 2251 mL  Total OUT: 2278.5 mL    Total NET: -1728.5 mL      09 Dec 2017 07:01  -  09 Dec 2017 11:22  --------------------------------------------------------  IN:  Total IN: 0 mL    OUT:    Bulb: 10 mL  Total OUT: 10 mL    Total NET: -10 mL        Exam:  GEN: A&Ox3, NAD  HEENT: atraumatic, normocephalic  CV: no JVD  RESP: no increased work of breathing, no use of accessory muscles  GI/ABD: soft, appropriately tender, mildly distended, no rebound or guarding, incision is c/d/i, JPx1 are serosanguinous  : deferred  EXTREMITIES: warm, pink, well-perfused                        12.1   11.18 )-----------( 238      ( 09 Dec 2017 06:00 )             37.9       12-09    145  |  107  |  12  ----------------------------<  96  3.8   |  23  |  0.51    Ca    9.4      09 Dec 2017 06:00  Phos  2.4     12-08  Mg     2.2     12-08        ASSESSMENT:  47 year old female with a history of ex-lap L ovarian cystectomy presents with 3 days of abdominal pain, nausea/vomiting, CT scan showed high-grade SBO with transition point at the ventral hernia s/p primary ventral hernia repair and small bowel resection.     PLAN:    - Diet: CLD, reg in PM  - Continue Protonix  - trend fevers, will image if continued   - monitor R PASCUAL output  - CPAP overnight   - encourage OOB/ambulation  - dvt ppx        Warner Kaur MD PGY-1  pager: [phone]

## 2017-12-10 ENCOUNTER — TRANSCRIPTION ENCOUNTER (OUTPATIENT)
Age: 47
End: 2017-12-10

## 2017-12-10 VITALS
HEART RATE: 88 BPM | SYSTOLIC BLOOD PRESSURE: 169 MMHG | DIASTOLIC BLOOD PRESSURE: 92 MMHG | RESPIRATION RATE: 16 BRPM | OXYGEN SATURATION: 95 % | TEMPERATURE: 98 F

## 2017-12-10 LAB
BUN SERPL-MCNC: 10 MG/DL — SIGNIFICANT CHANGE UP (ref 7–23)
CALCIUM SERPL-MCNC: 9.4 MG/DL — SIGNIFICANT CHANGE UP (ref 8.4–10.5)
CHLORIDE SERPL-SCNC: 106 MMOL/L — SIGNIFICANT CHANGE UP (ref 98–107)
CO2 SERPL-SCNC: 24 MMOL/L — SIGNIFICANT CHANGE UP (ref 22–31)
CREAT SERPL-MCNC: 0.5 MG/DL — SIGNIFICANT CHANGE UP (ref 0.5–1.3)
GLUCOSE SERPL-MCNC: 104 MG/DL — HIGH (ref 70–99)
HCT VFR BLD CALC: 37.3 % — SIGNIFICANT CHANGE UP (ref 34.5–45)
HGB BLD-MCNC: 11.9 G/DL — SIGNIFICANT CHANGE UP (ref 11.5–15.5)
MAGNESIUM SERPL-MCNC: 2.1 MG/DL — SIGNIFICANT CHANGE UP (ref 1.6–2.6)
MCHC RBC-ENTMCNC: 27.9 PG — SIGNIFICANT CHANGE UP (ref 27–34)
MCHC RBC-ENTMCNC: 31.9 % — LOW (ref 32–36)
MCV RBC AUTO: 87.6 FL — SIGNIFICANT CHANGE UP (ref 80–100)
NRBC # FLD: 0 — SIGNIFICANT CHANGE UP
PHOSPHATE SERPL-MCNC: 2.9 MG/DL — SIGNIFICANT CHANGE UP (ref 2.5–4.5)
PLATELET # BLD AUTO: 268 K/UL — SIGNIFICANT CHANGE UP (ref 150–400)
PMV BLD: 11.7 FL — SIGNIFICANT CHANGE UP (ref 7–13)
POTASSIUM SERPL-MCNC: 3.6 MMOL/L — SIGNIFICANT CHANGE UP (ref 3.5–5.3)
POTASSIUM SERPL-SCNC: 3.6 MMOL/L — SIGNIFICANT CHANGE UP (ref 3.5–5.3)
RBC # BLD: 4.26 M/UL — SIGNIFICANT CHANGE UP (ref 3.8–5.2)
RBC # FLD: 15.1 % — HIGH (ref 10.3–14.5)
SODIUM SERPL-SCNC: 144 MMOL/L — SIGNIFICANT CHANGE UP (ref 135–145)
WBC # BLD: 10.04 K/UL — SIGNIFICANT CHANGE UP (ref 3.8–10.5)
WBC # FLD AUTO: 10.04 K/UL — SIGNIFICANT CHANGE UP (ref 3.8–10.5)

## 2017-12-10 RX ADMIN — Medication 3 MILLILITER(S): at 10:24

## 2017-12-10 RX ADMIN — Medication 3 MILLILITER(S): at 16:23

## 2017-12-10 RX ADMIN — Medication 3 MILLILITER(S): at 05:07

## 2017-12-10 RX ADMIN — PANTOPRAZOLE SODIUM 40 MILLIGRAM(S): 20 TABLET, DELAYED RELEASE ORAL at 08:41

## 2017-12-10 RX ADMIN — Medication 5 MILLIGRAM(S): at 13:07

## 2017-12-10 NOTE — DISCHARGE NOTE ADULT - INSTRUCTIONS
None Increasing smelly discharge from wound, redness, fever 100.4 and higher, with increased pain not relieved by medication, bloating, nausea and vomiting call MD.

## 2017-12-10 NOTE — DISCHARGE NOTE ADULT - MEDICATION SUMMARY - MEDICATIONS TO TAKE
I will START or STAY ON the medications listed below when I get home from the hospital:    aspirin 81 mg oral tablet  -- 1 tab(s) by mouth once a day  -- Indication: For CAD    Percocet 5/325 oral tablet  -- 1 tab(s) by mouth every 6 hours, As Needed -for severe pain MDD:4   -- Caution federal law prohibits the transfer of this drug to any person other  than the person for whom it was prescribed.  May cause drowsiness.  Alcohol may intensify this effect.  Use care when operating dangerous machinery.  This prescription cannot be refilled.  This product contains acetaminophen.  Do not use  with any other product containing acetaminophen to prevent possible liver damage.  Using more of this medication than prescribed may cause serious breathing problems.    -- Indication: For Severe pain    valsartan-hydrochlorothiazide 320mg-25mg oral tablet  -- 1 tab(s) by mouth once a day  -- Indication: For Hypertension    amLODIPine 10 mg oral tablet  -- 1 tab(s) by mouth once a day  -- Indication: For Hypertension    Colace 100 mg oral capsule  -- 1 cap(s) by mouth once a day, As Needed  -- Indication: For Constipation    MiraLax oral powder for reconstitution  -- Indication: For Constipation    omeprazole 40 mg oral delayed release capsule  -- 1 cap(s) by mouth once a day  -- Indication: For GERD with esophagitis

## 2017-12-10 NOTE — DISCHARGE NOTE ADULT - PLAN OF CARE
Post-op recovery WOUND CARE:  Clean the area with warm soapy water, pat dry.   BATHING: Please do not submerge wound underwater. You may shower and/or sponge bathe.  ACTIVITY: No heavy lifting or straining. Otherwise, you may return to your usual level of physical activity. If you are taking narcotic pain medication (such as Percocet) DO NOT drive a car, operate machinery or make important decisions.  DIET: You may resume your regular diet.  PAIN: please take Tylenol and Motrin as needed for mild to moderate pain.  For severe pain, you make take Percocet (Rx sent to your pharmacy).  Please do not take Percocet within 6 hours of taking Tylenol.    NOTIFY YOUR SURGEON IF: You have any bleeding that does not stop, any pus draining from your wound(s), any fever (over 100.4 F) or chills, persistent nausea/vomiting, persistent diarrhea, or if your pain is not controlled on your discharge pain medications.  FOLLOW-UP: Please follow up with your primary care physician in week regarding your hospitalization.

## 2017-12-10 NOTE — DISCHARGE NOTE ADULT - HOSPITAL COURSE
This patient is assessed as being a morbidly obese (BMI = 36.8), hypertensive female who has had symptoms of gastro-esophageal reflux disease and left lower extremity cellulitis who presented to the ED at [facility] at approximately 4:40 PM on 12/4/17 when she was referred from a physician’s clinic / office where she was found to have abdominal pain, nausea, emesis, and orthostatic hypotension. The pain began 3 days prior to hospitalization and progressively worsened. In the ED she was normotensive and did not have fever or tachycardia. She had a soft, obese abdomen with mild tenderness and she had an incarcerated ventral / incisional hernia. A CT scan revealed the presence of a high-grade small bowel obstruction at the level of the hernia prompting surgery. She underwent a laparotomy, small bowel resection with a primary anastomosis and primary repair of her ventral hernia (no mesh).    Post operatively she has had dyspnea and hypoxemia. She was given supplemental oxygen via a nasal cannula and subsequently a face tent. As she had oxygen desaturations mostly while sleeping, and she had a high STOP-BANG score it was thought that she likely had obstructive sleep apnea. She has since been given nocturnal BIPAP. The left sided subcutaneous drain partially fell out and was removed. Her post-operative pain was ameliorated with IV-PCA narcotics and as there was enteric spillage into the peritoneum during the laparotomy she was treated with parenteral antibiotics. She had an NGT providing gastric decompression but when she recently passed flatus the NGT was removed. Intermittently she has had fevers but her WBC is normal and she does not appear toxic. Her fever defervessed and she is no longer requiring IV-PCA narcotics. She is tolerating a regular diet. Her PCP's office was notified about her need for a sleep study to evaluate for likely diagnosis of obstructive sleep apnea. This patient is assessed as being a morbidly obese (BMI = 36.8), hypertensive female who has had symptoms of gastro-esophageal reflux disease and left lower extremity cellulitis who presented to the ED at [facility] at approximately 4:40 PM on 12/4/17 when she was referred from a physician’s clinic / office where she was found to have abdominal pain, nausea, emesis, and orthostatic hypotension. The pain began 3 days prior to hospitalization and progressively worsened. In the ED she was normotensive and did not have fever or tachycardia. She had a soft, obese abdomen with mild tenderness and she had an incarcerated ventral / incisional hernia. A CT scan revealed the presence of a high-grade small bowel obstruction at the level of the hernia prompting surgery. She was noted to have acute bowel ischemia which resolved with reduction of the hernia intraoperatively. She underwent a laparotomy, small bowel resection with a primary anastomosis and primary repair of her ventral hernia (no mesh).    Post operatively she has had dyspnea and hypoxemia. She was given supplemental oxygen via a nasal cannula and subsequently a face tent. As she had oxygen desaturations mostly while sleeping, and she had a high STOP-BANG score it was thought that she likely had obstructive sleep apnea. She has since been given nocturnal BIPAP. She also had some acute pulmonary edema which resolved. The left sided subcutaneous drain partially fell out and was removed. Her post-operative pain was ameliorated with IV-PCA narcotics and as there was enteric spillage into the peritoneum during the laparotomy she was treated with parenteral antibiotics. She had an NGT providing gastric decompression but when she recently passed flatus the NGT was removed. Intermittently she has had fevers but her WBC is normal and she does not appear toxic. Her fever defervessed and she is no longer requiring IV-PCA narcotics. She is tolerating a regular diet. Her PCP's office was notified about her need for a sleep study to evaluate for likely diagnosis of obstructive sleep apnea.

## 2017-12-10 NOTE — CHART NOTE - NSCHARTNOTEFT_GEN_A_CORE
December 10th, 2017  10:30 AM    Hospital Day #5  Post op Day #5    This patient was seen and evaluated on daily B-Team rounds. Care discussed at B-Team morning report sign-out.    BP		=	138 – 175/84 - 118  P		=	72 - 101	O2 Sat	=	93% - 98%  R		=	18 - 20		I/O	=	- in/440 out  Temp		=	36.7 – 37.1			- 0.5 l since admit (excluding OR)  							R-PASCUAL	=	37 ml  Glucose	=	93 – 130    Weight		=	100.4 Kg  BMI		=	36.8    Awake, alert, and fully oriented. No longer diaphoretic. Ambulating intermittently.  Anicteric. Pupils reactive. Extra-occular movements intact.  No thrush. Normal oropharyngeal mucosa.  No JVD.  Lungs with non-labored respirations. Symmetrical chest wall movements. No wheezing now. No rales.  COR - RRR. No murmur.  Abdomen obese. Appropriately tender. Midline lower abdominal wound OK. PASCUAL drain serous fluid.  Extremities with dependent edema and venous stasis type ulcer in the left lower extremity.    WBC	=	10	Na		=	144  Neutro	=	-	K		=	3.6  Hgb	=	12	Cl		=	106  Hct	=	37%	HCO3		=	24  Plts	=	268	Glucose	=	104  			BUN		=	10  PT	=	-	Creat		=	0.5  PTT	=	-  INR	=	-	Pro-BNP	=	1,831    Chest radiograph 12/9 –Pulmonary edema, possibly infiltrates.    Chest radiograph 12/7 – some right sided atelectasis and volume overload (pulmonary edema). NGT in place.    Urine 12/8		-	3 organisms (probably contaminant)  Blood 12/7		-	NTD  Blood 12/7		-	NTD  Peritoneal fluid 12/5	-	E coli that was pan sensitive  			-	Coagulase negative staph.  Peritoneal fluid 12/5	-	Yeast - negative    Remains on:    1)	Regular diet  2)	Dilaudid 0.5 mg IVP q4 hours prn  3)	Protonix 40 mg po q24 hours  4)	Enoxaparin 40 mg sq q24 hours  5)	Vasotec 5 mg po q24 hours  6)	Norvasc 10 mg po q24 hours  7)	Duoneb 3 ml via nebulizer q6 hours  8)	Supplemental oxygen via a nasal cannula and nocturnal BIPAP.    Assessment:	This patient is assessed as being a morbidly obese (BMI = 36.8), hypertensive female who has had symptoms of gastro-esophageal reflux disease and left lower extremity cellulitis who presented to the ED at [facility] at approximately 4:40 PM on 12/4/17 when she was referred from a physician’s clinic / office where she was found to have abdominal pain, nausea, emesis, and orthostatic hypotension. The pain began 3 days prior to hospitalization and progressively worsened. In the ED she was normotensive and did not have fever or tachycardia. She had a soft, obese abdomen with mild tenderness and she had an incarcerated ventral / incisional hernia. A CT scan revealed the presence of a high-grade small bowel obstruction at the level of the hernia prompting surgery. She underwent a laparotomy, small bowel resection with a primary anastomosis and primary repair of her ventral hernia (no mesh).    Post operatively she has had dyspnea and hypoxemia. She was given supplemental oxygen via a nasal cannula and subsequently a face tent. As she had oxygen desaturations mostly while sleeping, and she had a high STOP-BANG score it was thought that she likely had obstructive sleep apnea. She has since been given nocturnal BIPAP. The left sided subcutaneous drain partially fell out and was removed. Her post-operative pain was ameliorated with IV-PCA narcotics and as there was enteric spillage into the peritoneum during the laparotomy she was treated with parenteral antibiotics. She had an NGT providing gastric decompression but when she recently passed flatus the NGT was removed. Intermittently she has had fevers but her WBC is normal and she does not appear toxic. Her fever defervessed and she is no longer requiring IV-PCA narcotics. She is being given a regular oral diet.    Plan to:    1)	Clarify orders.  2)	Remove the remaining PASCUAL drain  3)	Discontinue the acchchecks  4)	Allow and assist her to be out of bed.  5)	Inform the patient’s internist that this patient has been hospitalized.  6)	Plan hospital discharge  7)	Full support in place.    Krzysztof Pantoja  20 Minutes exclusive of procedures. December 10th, 2017  10:30 AM    Hospital Day #5  Post op Day #5    This patient was seen and evaluated on daily B-Team rounds. Care discussed at B-Team morning report sign-out.    BP		=	138 – 175/84 - 118  P		=	72 - 101	O2 Sat	=	93% - 98%  R		=	18 - 20		I/O	=	- in/440 out  Temp		=	36.7 – 37.1			- 0.5 l since admit (excluding OR)  							R-PASCUAL	=	37 ml  Glucose	=	93 – 130    Weight		=	100.4 Kg  BMI		=	36.8    Awake, alert, and fully oriented. No longer diaphoretic. Ambulating intermittently.  Anicteric. Pupils reactive. Extra-occular movements intact.  No thrush. Normal oropharyngeal mucosa.  No JVD.  Lungs with non-labored respirations. Symmetrical chest wall movements. No wheezing now. No rales.  COR - RRR. No murmur.  Abdomen obese. Appropriately tender. Midline lower abdominal wound OK. PASCUAL drain serous fluid. Reportedly had some drainage from the left side of her abdomen earlier today.  Extremities with less and venous stasis type ulcer in the left lower extremity.    WBC	=	10	Na		=	144  Neutro	=	-	K		=	3.6  Hgb	=	12	Cl		=	106  Hct	=	37%	HCO3		=	24  Plts	=	268	Glucose	=	104  			BUN		=	10  PT	=	-	Creat		=	0.5  PTT	=	-  INR	=	-	Pro-BNP	=	1,831    Chest radiograph 12/9 –Pulmonary edema, possibly infiltrates.    Chest radiograph 12/7 – some right sided atelectasis and volume overload (pulmonary edema). NGT in place.    Urine 12/8		-	3 organisms (probably contaminant)  Blood 12/7		-	NTD  Blood 12/7		-	NTD  Peritoneal fluid 12/5	-	E coli that was pan sensitive  			-	Coagulase negative staph.  Peritoneal fluid 12/5	-	Yeast - negative    Remains on:    1)	Regular diet  2)	Dilaudid 0.5 mg IVP q4 hours prn  3)	Protonix 40 mg po q24 hours  4)	Enoxaparin 40 mg sq q24 hours  5)	Vasotec 5 mg po q24 hours  6)	Norvasc 10 mg po q24 hours  7)	Duoneb 3 ml via nebulizer q6 hours  8)	Supplemental oxygen via a nasal cannula and nocturnal BIPAP.    Assessment:	This patient is assessed as being a morbidly obese (BMI = 36.8), hypertensive female who has had symptoms of gastro-esophageal reflux disease and left lower extremity cellulitis who presented to the ED at [facility] at approximately 4:40 PM on 12/4/17 when she was referred from a physician’s clinic / office where she was found to have abdominal pain, nausea, emesis, and orthostatic hypotension. The pain began 3 days prior to hospitalization and progressively worsened. In the ED she was normotensive and did not have fever or tachycardia. She had a soft, obese abdomen with mild tenderness and she had an incarcerated ventral / incisional hernia. A CT scan revealed the presence of a high-grade small bowel obstruction at the level of the hernia prompting surgery. She underwent a laparotomy, small bowel resection with a primary anastomosis and primary repair of her ventral hernia (no mesh).    Post operatively she has had dyspnea and hypoxemia. She was given supplemental oxygen via a nasal cannula and subsequently a face tent. As she had oxygen desaturations mostly while sleeping, and she had a high STOP-BANG score it was thought that she likely had obstructive sleep apnea. She has since been given nocturnal BIPAP. The left sided subcutaneous drain partially fell out and was removed. Her post-operative pain was ameliorated with IV-PCA narcotics and as there was enteric spillage into the peritoneum during the laparotomy she was treated with parenteral antibiotics. She had an NGT providing gastric decompression but when she recently passed flatus the NGT was removed. Intermittently she has had fevers but her WBC is normal and she does not appear toxic. Her fever defervessed and she is no longer requiring IV-PCA narcotics. She is to be given a regular oral diet for lunch.    Plan to:    1)	Clarify orders.  2)	Remove the remaining PASCUAL drain  3)	Discontinue the acchchecks  4)	Allow her to be out of bed.  5)	Inform the patient’s internist that this patient has been hospitalized.  6)	Plan hospital discharge. If she tolerates an regular oral diet, that I  	think she will, she may be discharged to home. The patient was  	informed that I plan for her to go home later today.  	She will follow-up with Dr. Liu later this week. She may have a  	regular oral diet and full activity. She may bathe / shower.  	She should call Dr. Liu's office at [phone] for an appointment  	or for any problems.  7)	Full support in place.    Krzysztof Pantoja  20 Minutes exclusive of procedures.

## 2017-12-10 NOTE — DISCHARGE NOTE ADULT - PATIENT PORTAL LINK FT
“You can access the Follow[facility] Patient Portal, offered by [facility], by registering with the following website: [email or web address]”

## 2017-12-10 NOTE — PROGRESS NOTE ADULT - ASSESSMENT
ASSESSMENT:  47 year old female with a history of ex-lap L ovarian cystectomy presents with 3 days of abdominal pain, nausea/vomiting, CT scan showed high-grade SBO with transition point at the ventral hernia s/p primary ventral hernia repair and small bowel resection.     PLAN:  - Diet: Regular  - Continue Protonix  - trend fevers, will image if continued   - monitor R PASCUAL output; d/c if patient going home today  - encourage OOB/ambulation  - dvt ppx    B Team Surgery  [id number]

## 2017-12-10 NOTE — DISCHARGE NOTE ADULT - CARE PLAN
Principal Discharge DX:	Small bowel obstruction  Goal:	Post-op recovery  Instructions for follow-up, activity and diet:	WOUND CARE:  Clean the area with warm soapy water, pat dry.   BATHING: Please do not submerge wound underwater. You may shower and/or sponge bathe.  ACTIVITY: No heavy lifting or straining. Otherwise, you may return to your usual level of physical activity. If you are taking narcotic pain medication (such as Percocet) DO NOT drive a car, operate machinery or make important decisions.  DIET: You may resume your regular diet.  PAIN: please take Tylenol and Motrin as needed for mild to moderate pain.  For severe pain, you make take Percocet (Rx sent to your pharmacy).  Please do not take Percocet within 6 hours of taking Tylenol.    NOTIFY YOUR SURGEON IF: You have any bleeding that does not stop, any pus draining from your wound(s), any fever (over 100.4 F) or chills, persistent nausea/vomiting, persistent diarrhea, or if your pain is not controlled on your discharge pain medications.  FOLLOW-UP: Please follow up with your primary care physician in week regarding your hospitalization.

## 2017-12-10 NOTE — PROGRESS NOTE ADULT - SUBJECTIVE AND OBJECTIVE BOX
Surgery Progress Note    S: Patient seen and examined. No acute events overnight. Pain well controlled with current regimen. Patient reports passing gas and having bowel movements. Some staples from midline incision were open and some fat necrosis was expressed. Patient continues to be on nasal canula but reports her breathing is comfortable.    O:  Vital Signs Last 24 Hrs  T(C): 36.8 (10 Dec 2017 10:28), Max: 37.7 (09 Dec 2017 17:54)  T(F): 98.3 (10 Dec 2017 10:28), Max: 99.9 (09 Dec 2017 17:54)  HR: 84 (10 Dec 2017 10:28) (74 - 101)  BP: 138/84 (10 Dec 2017 10:28) (138/84 - 175/118)  BP(mean): --  RR: 18 (10 Dec 2017 10:28) (18 - 20)  SpO2: 98% (10 Dec 2017 10:28) (93% - 98%)    I&O's Detail    09 Dec 2017 07:01  -  10 Dec 2017 07:00  --------------------------------------------------------  IN:  Total IN: 0 mL    OUT:    Bulb: 37 mL    Voided: 400 mL  Total OUT: 437 mL    Total NET: -437 mL          MEDICATIONS  (STANDING):  ALBUTerol/ipratropium for Nebulization 3 milliLiter(s) Nebulizer every 6 hours  amLODIPine   Tablet 10 milliGRAM(s) Oral <User Schedule>  enalapril 5 milliGRAM(s) Oral <User Schedule>  enoxaparin Injectable 40 milliGRAM(s) SubCutaneous <User Schedule>  pantoprazole    Tablet 40 milliGRAM(s) Oral before breakfast    MEDICATIONS  (PRN):                            11.9   10.04 )-----------( 268      ( 10 Dec 2017 06:00 )             37.3       12-10    144  |  106  |  10  ----------------------------<  104<H>  3.6   |  24  |  0.50    Ca    9.4      10 Dec 2017 06:00  Phos  2.9     12-10  Mg     2.1     12-10        Physical Exam:  Gen: Laying in bed, NAD, alert and oriented.   Resp: Unlabored breathing  Abd: soft, appropriately tender, mildly distended, no rebound or guarding, incision is c/d/i, JPx1 are serosanguinous

## 2017-12-10 NOTE — DISCHARGE NOTE ADULT - FINDINGS/TREATMENT
there were loops of bowel tightly adherent to the abdominal wall. During lysis of adhesion, enterotomy was inevitable. This piece of bowel (about 8cm) was resected. Proximal bowel was dilated. There was some enteric content spillage and the abdomen was copiously irrigated. There were punctate duskiness proximally, which improved through out the course of the case. SBR and primary anastomosis. Fascia closed with interrupted vicryl. 2 Dima in the SQ

## 2017-12-10 NOTE — DISCHARGE NOTE ADULT - NSTOBACCOHOTLINE_GEN_A_NCS
[facility] Smokers Quitline ([id number]) [facility] Smokers Quitline ([phone])

## 2017-12-12 LAB
BACTERIA BLD CULT: SIGNIFICANT CHANGE UP
BACTERIA BLD CULT: SIGNIFICANT CHANGE UP

## 2017-12-14 ENCOUNTER — LABORATORY RESULT (OUTPATIENT)
Age: 47
End: 2017-12-14

## 2017-12-14 ENCOUNTER — INPATIENT (INPATIENT)
Facility: HOSPITAL | Age: 47
LOS: 0 days | Discharge: HOME HEALTH | End: 2017-12-15
Attending: SURGERY | Admitting: SURGERY
Payer: COMMERCIAL

## 2017-12-14 ENCOUNTER — APPOINTMENT (OUTPATIENT)
Dept: TRAUMA SURGERY | Facility: CLINIC | Age: 47
End: 2017-12-14
Payer: COMMERCIAL

## 2017-12-14 VITALS
TEMPERATURE: 97.8 F | WEIGHT: 220 LBS | HEART RATE: 81 BPM | DIASTOLIC BLOOD PRESSURE: 83 MMHG | BODY MASS INDEX: 36.65 KG/M2 | HEIGHT: 65 IN | SYSTOLIC BLOOD PRESSURE: 122 MMHG

## 2017-12-14 VITALS
OXYGEN SATURATION: 97 % | HEART RATE: 80 BPM | TEMPERATURE: 98 F | RESPIRATION RATE: 20 BRPM | SYSTOLIC BLOOD PRESSURE: 130 MMHG | DIASTOLIC BLOOD PRESSURE: 81 MMHG

## 2017-12-14 DIAGNOSIS — Z90.49 ACQUIRED ABSENCE OF OTHER SPECIFIED PARTS OF DIGESTIVE TRACT: Chronic | ICD-10-CM

## 2017-12-14 DIAGNOSIS — T81.4XXA INFECTION FOLLOWING A PROCEDURE, INITIAL ENCOUNTER: ICD-10-CM

## 2017-12-14 DIAGNOSIS — N80.9 ENDOMETRIOSIS, UNSPECIFIED: Chronic | ICD-10-CM

## 2017-12-14 LAB
ALBUMIN SERPL ELPH-MCNC: 3.1 G/DL — LOW (ref 3.3–5)
ALP SERPL-CCNC: 106 U/L — SIGNIFICANT CHANGE UP (ref 40–120)
ALT FLD-CCNC: 57 U/L — HIGH (ref 4–33)
AST SERPL-CCNC: 21 U/L — SIGNIFICANT CHANGE UP (ref 4–32)
BASOPHILS # BLD AUTO: 0.05 K/UL — SIGNIFICANT CHANGE UP (ref 0–0.2)
BASOPHILS NFR BLD AUTO: 0.4 % — SIGNIFICANT CHANGE UP (ref 0–2)
BILIRUB SERPL-MCNC: 0.5 MG/DL — SIGNIFICANT CHANGE UP (ref 0.2–1.2)
BUN SERPL-MCNC: 12 MG/DL — SIGNIFICANT CHANGE UP (ref 7–23)
CALCIUM SERPL-MCNC: 10.1 MG/DL — SIGNIFICANT CHANGE UP (ref 8.4–10.5)
CHLORIDE SERPL-SCNC: 100 MMOL/L — SIGNIFICANT CHANGE UP (ref 98–107)
CO2 SERPL-SCNC: 28 MMOL/L — SIGNIFICANT CHANGE UP (ref 22–31)
CREAT SERPL-MCNC: 0.63 MG/DL — SIGNIFICANT CHANGE UP (ref 0.5–1.3)
EOSINOPHIL # BLD AUTO: 0.27 K/UL — SIGNIFICANT CHANGE UP (ref 0–0.5)
EOSINOPHIL NFR BLD AUTO: 2.2 % — SIGNIFICANT CHANGE UP (ref 0–6)
GLUCOSE SERPL-MCNC: 101 MG/DL — HIGH (ref 70–99)
HCT VFR BLD CALC: 37.5 % — SIGNIFICANT CHANGE UP (ref 34.5–45)
HGB BLD-MCNC: 11.9 G/DL — SIGNIFICANT CHANGE UP (ref 11.5–15.5)
IMM GRANULOCYTES # BLD AUTO: 0.16 # — SIGNIFICANT CHANGE UP
IMM GRANULOCYTES NFR BLD AUTO: 1.3 % — SIGNIFICANT CHANGE UP (ref 0–1.5)
LYMPHOCYTES # BLD AUTO: 18.3 % — SIGNIFICANT CHANGE UP (ref 13–44)
LYMPHOCYTES # BLD AUTO: 2.29 K/UL — SIGNIFICANT CHANGE UP (ref 1–3.3)
MAGNESIUM SERPL-MCNC: 2.1 MG/DL — SIGNIFICANT CHANGE UP (ref 1.6–2.6)
MCHC RBC-ENTMCNC: 28.1 PG — SIGNIFICANT CHANGE UP (ref 27–34)
MCHC RBC-ENTMCNC: 31.7 % — LOW (ref 32–36)
MCV RBC AUTO: 88.7 FL — SIGNIFICANT CHANGE UP (ref 80–100)
MONOCYTES # BLD AUTO: 0.89 K/UL — SIGNIFICANT CHANGE UP (ref 0–0.9)
MONOCYTES NFR BLD AUTO: 7.1 % — SIGNIFICANT CHANGE UP (ref 2–14)
NEUTROPHILS # BLD AUTO: 8.87 K/UL — HIGH (ref 1.8–7.4)
NEUTROPHILS NFR BLD AUTO: 70.7 % — SIGNIFICANT CHANGE UP (ref 43–77)
NRBC # FLD: 0 — SIGNIFICANT CHANGE UP
PHOSPHATE SERPL-MCNC: 3.9 MG/DL — SIGNIFICANT CHANGE UP (ref 2.5–4.5)
PLATELET # BLD AUTO: 516 K/UL — HIGH (ref 150–400)
PMV BLD: 11 FL — SIGNIFICANT CHANGE UP (ref 7–13)
POTASSIUM SERPL-MCNC: 3.8 MMOL/L — SIGNIFICANT CHANGE UP (ref 3.5–5.3)
POTASSIUM SERPL-SCNC: 3.8 MMOL/L — SIGNIFICANT CHANGE UP (ref 3.5–5.3)
PROT SERPL-MCNC: 7.2 G/DL — SIGNIFICANT CHANGE UP (ref 6–8.3)
RBC # BLD: 4.23 M/UL — SIGNIFICANT CHANGE UP (ref 3.8–5.2)
RBC # FLD: 14.6 % — HIGH (ref 10.3–14.5)
SODIUM SERPL-SCNC: 141 MMOL/L — SIGNIFICANT CHANGE UP (ref 135–145)
WBC # BLD: 12.53 K/UL — HIGH (ref 3.8–10.5)
WBC # FLD AUTO: 12.53 K/UL — HIGH (ref 3.8–10.5)

## 2017-12-14 PROCEDURE — 99024 POSTOP FOLLOW-UP VISIT: CPT

## 2017-12-14 PROCEDURE — 74177 CT ABD & PELVIS W/CONTRAST: CPT | Mod: 26

## 2017-12-14 RX ORDER — DOCUSATE SODIUM 100 MG
100 CAPSULE ORAL DAILY
Qty: 0 | Refills: 0 | Status: DISCONTINUED | OUTPATIENT
Start: 2017-12-14 | End: 2017-12-15

## 2017-12-14 RX ORDER — AMLODIPINE BESYLATE 2.5 MG/1
10 TABLET ORAL DAILY
Qty: 0 | Refills: 0 | Status: DISCONTINUED | OUTPATIENT
Start: 2017-12-14 | End: 2017-12-15

## 2017-12-14 RX ORDER — PANTOPRAZOLE SODIUM 20 MG/1
40 TABLET, DELAYED RELEASE ORAL
Qty: 0 | Refills: 0 | Status: DISCONTINUED | OUTPATIENT
Start: 2017-12-14 | End: 2017-12-15

## 2017-12-14 RX ORDER — CEFEPIME 1 G/1
2000 INJECTION, POWDER, FOR SOLUTION INTRAMUSCULAR; INTRAVENOUS EVERY 12 HOURS
Qty: 0 | Refills: 0 | Status: DISCONTINUED | OUTPATIENT
Start: 2017-12-15 | End: 2017-12-15

## 2017-12-14 RX ORDER — VANCOMYCIN HCL 1 G
1250 VIAL (EA) INTRAVENOUS EVERY 12 HOURS
Qty: 0 | Refills: 0 | Status: DISCONTINUED | OUTPATIENT
Start: 2017-12-15 | End: 2017-12-15

## 2017-12-14 RX ORDER — VANCOMYCIN HCL 1 G
1000 VIAL (EA) INTRAVENOUS ONCE
Qty: 0 | Refills: 0 | Status: COMPLETED | OUTPATIENT
Start: 2017-12-14 | End: 2017-12-14

## 2017-12-14 RX ORDER — VALSARTAN 80 MG/1
320 TABLET ORAL DAILY
Qty: 0 | Refills: 0 | Status: DISCONTINUED | OUTPATIENT
Start: 2017-12-14 | End: 2017-12-15

## 2017-12-14 RX ORDER — HYDROCHLOROTHIAZIDE 25 MG
25 TABLET ORAL DAILY
Qty: 0 | Refills: 0 | Status: DISCONTINUED | OUTPATIENT
Start: 2017-12-14 | End: 2017-12-15

## 2017-12-14 RX ORDER — ENOXAPARIN SODIUM 100 MG/ML
40 INJECTION SUBCUTANEOUS EVERY 24 HOURS
Qty: 0 | Refills: 0 | Status: DISCONTINUED | OUTPATIENT
Start: 2017-12-14 | End: 2017-12-15

## 2017-12-14 RX ORDER — CEFEPIME 1 G/1
1000 INJECTION, POWDER, FOR SOLUTION INTRAMUSCULAR; INTRAVENOUS ONCE
Qty: 0 | Refills: 0 | Status: COMPLETED | OUTPATIENT
Start: 2017-12-14 | End: 2017-12-14

## 2017-12-14 RX ORDER — POLYETHYLENE GLYCOL 3350 17 G/17G
17 POWDER, FOR SOLUTION ORAL DAILY
Qty: 0 | Refills: 0 | Status: DISCONTINUED | OUTPATIENT
Start: 2017-12-14 | End: 2017-12-15

## 2017-12-14 RX ORDER — ASPIRIN/CALCIUM CARB/MAGNESIUM 324 MG
81 TABLET ORAL DAILY
Qty: 0 | Refills: 0 | Status: DISCONTINUED | OUTPATIENT
Start: 2017-12-14 | End: 2017-12-15

## 2017-12-14 RX ADMIN — CEFEPIME 100 MILLIGRAM(S): 1 INJECTION, POWDER, FOR SOLUTION INTRAMUSCULAR; INTRAVENOUS at 17:41

## 2017-12-14 RX ADMIN — ENOXAPARIN SODIUM 40 MILLIGRAM(S): 100 INJECTION SUBCUTANEOUS at 22:24

## 2017-12-14 RX ADMIN — Medication 250 MILLIGRAM(S): at 18:23

## 2017-12-14 NOTE — ED PROVIDER NOTE - NS ED ROS FT
General: No fevers / chills  HENT: No ear pain, runny nose, or sore throat  Eyes: No visual changes  CP: No chest pain, palpitations, or light headedness  Resp: No shortness of breath, no cough  GI: + abdominal pain, no diarrhea, no constipation, no nausea, no vomiting  : No urinary fz, dysuria, or hematuria  Neuro: No numbness, tingling, or weakness  Endo: No hx of diabetes  Heme: No hx of easy bleeding or bruising

## 2017-12-14 NOTE — H&P ADULT - ASSESSMENT
48 yo F with erythema and drainage from midline wound following exlap and small bowel resection, afebrile and hemodynamically stable.    - continue local wound care with packing in open wound  - IV antibiotics with cephalosporin and vancomycin  - continue regular diet, pain control with oral analgesics PRN    discussed with Dr. Hunter  --DIANNA Gonzalez, [id number]

## 2017-12-14 NOTE — ED PROVIDER NOTE - OBJECTIVE STATEMENT
Hx ventral hernia w/ small bowel entrapment resected 12/5, now 9 days ago, presenting from wound care MD office for evaluation of possible infection. Pt notes that her sx began this morning with onset of pain along her surgical site. She has not been experiencing pain for the past few days after being discharged on the 10th, but notes that her pain began around this morning at 7:30AM, and seems to have progressively worsened along her wound site. She notes that her wound is open with a drainage packing in place, but that her wound care staff sent her here due to greenish discharge that was seen earlier today. She notes her current discharge mostly appears bloody. She has not had fevers since leaving the hospital, but notes that her post-op course was complicated by fevers and that she required multiple x-rays and was told by her surgeon to return for fevers as there was concern perhaps her bowels would seed an intraabdominal infection. No fevers, chills, nausea, vomiting, dysuria, hematuria, diarrhea, constipation, chest pain, sob. Hx ventral hernia w/ small bowel entrapment resected 12/5, now 9 days ago, presenting from Dr. Hunter's office for evaluation of possible infection. Pt notes that her sx began this morning with onset of pain along her surgical site. She has not been experiencing pain for the past few days after being discharged on the 10th, but notes that her pain began around this morning at 7:30AM, and seems to have progressively worsened along her wound site. She notes that her wound is open with a drainage packing in place, but that Dr. Hunter's office sent her here due to brownish discharge that was expressed during her wound check. She notes her current discharge mostly appears bloody. She has not had fevers since leaving the hospital, but notes that her post-op course was complicated by fevers and that she required multiple x-rays and was told by her surgeon to return for fevers as there was concern perhaps her bowels would seed an intraabdominal infection. No fevers, chills, nausea, vomiting, dysuria, hematuria, diarrhea, constipation, chest pain, sob.

## 2017-12-14 NOTE — ED ADULT TRIAGE NOTE - CHIEF COMPLAINT QUOTE
pt sent by pmd for admission IV antibiotics pt s/p post op 10 days ago had surgery for hernia in her bowel pt developed infection to surgical wound c/o discharge from incision area red painful

## 2017-12-14 NOTE — H&P ADULT - HISTORY OF PRESENT ILLNESS
HPI: 46 yo F with recent ex-lap, small bowel resection for incarcerated hernia, presenting from office where she was assessed at her post operative check with concern for surgical site infection.    PMHx: HTN, GERD, MIRTA, prior cellulitis, ventral hernia associated with bowel obstruction     Ventral hernia with bowel obstruction  Lower extremity cellulitis  GERD with esophagitis  Hypertension  No pertinent past medical history    PSHx: S/P small bowel resection  Endometrioma  S/P laparotomy  No significant past surgical history    Medications (home):   Allergies: penicillin (Rash)  (Intolerances: )  Social Hx:     Physical exam significant for midline erythema and drainage from wound, with intact fascia.    Imaging and labs remarkable for leukocytosis, with phlegmonous changes on CT scan. HPI: 46 yo F with recent ex-lap, small bowel resection for incarcerated hernia, presenting from office where she was assessed at her post operative check with concern for surgical site infection. Postoperatively, she had hypoxemia requiring supplemental oxygen and was recommended for further workup of MIRTA. She has been doing well at home, with pain improving, but noted drainage from her midline wound. Dr. Hunter had assessed her and recommended CT scan for better characterization of the wound, possible abscesses or any intraabdominal pathology.    In the ED, she underwent CT scan which showed no discrete collection or abscess, just inflammatory and post op changes at the open skin incision. Clean dry packing was placed in the wound, and she was started on IV antibiotics with cefepime and vancomycin. Previous cultures from last admission were reviewed (ecoli).    PMHx: HTN, GERD, MIRTA, prior leg cellulitis, ventral hernia associated with bowel obstruction, endometrioma    PSHx: ex lap small bowel resection, L ovarian cystectomy (2008)    Medications (home): losartan, HCTZ, amlodipine, omeprazole, ASA  Allergies: penicillin (Rash)  (Intolerances: None  Social Hx: Never smoker, denies EtOH and illicit drug use. 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    Physical exam significant for midline erythema and drainage from wound, with intact fascia.    Imaging and labs remarkable for leukocytosis, with phlegmonous changes on CT scan.

## 2017-12-14 NOTE — ED PROVIDER NOTE - PROGRESS NOTE DETAILS
Surgery paged, discussed patient's case, surg will see pt, requesting cbc, cmp, ct abd pelvis w/ iv w/ oral for eval. CAROL Naranjo, EM PGY1 Keila: Pt seen and examined by surgery- requesting abx for post op infection, CT pending. Pt started PO contrast ~1800, awaiting CT at 1900. Re-evaluated and feels unchanged from previous eval, denies any recent change in symptoms. CAROL Naranjo, CHRIS PGY1

## 2017-12-14 NOTE — ED PROVIDER NOTE - PMH
GERD with esophagitis    Hypertension    Lower extremity cellulitis    Ventral hernia with bowel obstruction

## 2017-12-14 NOTE — ED PROVIDER NOTE - PHYSICAL EXAMINATION
Gen: NAD, non-toxic, conversational  Eyes: PERRL, EOMI   HENT: Normocephalic, atraumatic. External ears normal, no rhinorrhea, moist mucous membranes.   CV: RRR, no M/R/G  Resp: CTAB, non-labored, speaking without difficulty on room air  Abd: soft, non tender, non rigid, no guarding or rebound tenderness, there is a wound dressing of the suprapubic region with a verticle incision exiting the dressing area inferiorly with visible staples as well as gauze packing; serosanguinous discharge is present along the side of the wound packing. There is no greenish discharge or malodor at this time, although packing appears freshly changed.   Skin: dry, wwp   Neuro: AOx3, speech is fluent and appropriate  Psych: Mood good, affect euthymic Gen: NAD, non-toxic, conversational  Eyes: PERRL, EOMI   HENT: Normocephalic, atraumatic. External ears normal, no rhinorrhea, moist mucous membranes.   CV: RRR, no M/R/G  Resp: CTAB, non-labored, speaking without difficulty on room air  Abd: soft, non tender, non rigid, no guarding or rebound tenderness, there is a wound dressing of the suprapubic region with a verticle incision exiting the dressing area inferiorly with visible staples as well as gauze packing; serosanguinous discharge is present along the side of the wound packing. Packing material appears to have been recently changed.   Skin: dry, wwp   Neuro: AOx3, speech is fluent and appropriate  Psych: Mood good, affect euthymic Gen: NAD, non-toxic, conversational  Eyes: PERRL, EOMI   HENT: Normocephalic, atraumatic. External ears normal, no rhinorrhea, moist mucous membranes.   CV: RRR, no M/R/G  Resp: CTAB, non-labored, speaking without difficulty on room air  Abd: soft, non tender, non rigid, no guarding or rebound tenderness, there is a wound dressing of the suprapubic region with a vertical incision exiting the dressing area inferiorly with visible staples as well as gauze packing; serosanguinous discharge is present along the side of the wound packing. Packing material appears to have been recently changed.   Skin: dry, wwp   Neuro: AOx3, speech is fluent and appropriate  Psych: Mood good, affect euthymic

## 2017-12-14 NOTE — ED PROVIDER NOTE - ABDOMINAL EXAM
nontender.../soft/midline, vertical abdominal wound, staples in place, lower abdomen with open area about 3cm open with packing in place

## 2017-12-14 NOTE — H&P ADULT - NSHPLABSRESULTS_GEN_ALL_CORE
Labs:                        11.9   12.53 )-----------( 516      ( 14 Dec 2017 16:33 )             37.5   12-14    141  |  100  |  12  ----------------------------<  101<H>  3.8   |  28  |  0.63    Ca    10.1      14 Dec 2017 16:33  Phos  3.9     12-14  Mg     2.1     12-14    TPro  7.2  /  Alb  3.1<L>  /  TBili  0.5  /  DBili  x   /  AST  21  /  ALT  57<H>  /  AlkPhos  106  12-14    < from: CT Abdomen and Pelvis w/ Oral Cont and w/ IV Cont (12.14.17 @ 19:17) >    IMPRESSION: Open midline incision with inflammatory changes in the surrounding fat, as described above. No focal fluid collection.    < end of copied text >

## 2017-12-14 NOTE — ED ADULT NURSE NOTE - OBJECTIVE STATEMENT
Pt AOX4, amb, c/o foul smelling discharge from abd wound for sx performed last week. IV 22g stated in left ac, VSS, NAD.

## 2017-12-14 NOTE — ED PROVIDER NOTE - MEDICAL DECISION MAKING DETAILS
47yF recent small bowel resection on 12/5, now with post-op wound discharge concerning for infection vs fistula vs hematoma among other etiologies. Surgery consultation placed given that pt is recently discharged, given no fevers will hold antibiotics currently. Labs, imaging, f/u results, reassess, dispo. 47yF recent small bowel resection on 12/5, now with post-op wound discharge concerning for infection vs fistula vs hematoma among other etiologies. Surgery consultation placed given that pt is recently discharged. Labs, imaging, f/u results, reassess, dispo likely admission.

## 2017-12-14 NOTE — H&P ADULT - NSHPPHYSICALEXAM_GEN_ALL_CORE
General: well developed, well nourished, NAD  Neuro: alert and oriented, no focal deficits, moves all extremities spontaneously  HEENT: NCAT, EOMI, anicteric, mucosa moist  Respiratory: airway patent, respirations unlabored  CVS: regular rate and rhythm  Abdomen: soft, obese, nontender, midline incision with staples, open inferiorly with serosanguinous staining of packing, no purulence, fascia intact to gentle probing  Extremities: no edema, sensation and movement grossly intact  Skin: warm, dry, appropriate color

## 2017-12-14 NOTE — ED PROVIDER NOTE - ATTENDING CONTRIBUTION TO CARE
I performed a history and physical exam of the patient and discussed their management with the resident. I reviewed the resident's note and agree with the documented findings and plan of care. I performed a history and physical exam of the patient and discussed their management with the resident. I reviewed the resident's note and agree with the documented findings and plan of care.    Patient is a 48 yo F with hx of incarcerated hernia and high-grade SBO s/p removal of 8 cm of small bowel here for evaluation of discharge from incisional wound. Pt states discharge was brown and noted today by wound nurse. She was evaluated by Dr. Hunter (surgeon) who sent patient in for evaluation of collection vs fistula. Denies fevers, chills, nausea, vomiting.   VS noted  Gen. no acute distress, Non toxic   HEENT: EOMI, mmm,   Lungs: CTAB/L no C/ W /R   CVS: RRR   Abd; Soft non tender, non distended. large vertical incisional wound, closed with staples - wound is clean and dry, non-tender, inferior portion of wound about 3 cm with packing in place, no apparent discharge on my exam  Ext: no edema  Skin: no rash  Neuro AAOx3 non focal clear speech  a/p: post-op wound infection - r/o abscess vs fistula, consult surgery - will get labs, CT A/P with IV and PO contrast.

## 2017-12-15 ENCOUNTER — TRANSCRIPTION ENCOUNTER (OUTPATIENT)
Age: 47
End: 2017-12-15

## 2017-12-15 VITALS
DIASTOLIC BLOOD PRESSURE: 83 MMHG | SYSTOLIC BLOOD PRESSURE: 133 MMHG | TEMPERATURE: 98 F | RESPIRATION RATE: 18 BRPM | OXYGEN SATURATION: 94 % | HEART RATE: 80 BPM

## 2017-12-15 LAB
BASOPHILS # BLD AUTO: 0.06 K/UL — SIGNIFICANT CHANGE UP (ref 0–0.2)
BASOPHILS NFR BLD AUTO: 0.6 % — SIGNIFICANT CHANGE UP (ref 0–2)
BUN SERPL-MCNC: 11 MG/DL — SIGNIFICANT CHANGE UP (ref 7–23)
CALCIUM SERPL-MCNC: 9.2 MG/DL — SIGNIFICANT CHANGE UP (ref 8.4–10.5)
CHLORIDE SERPL-SCNC: 102 MMOL/L — SIGNIFICANT CHANGE UP (ref 98–107)
CO2 SERPL-SCNC: 27 MMOL/L — SIGNIFICANT CHANGE UP (ref 22–31)
CREAT SERPL-MCNC: 0.65 MG/DL — SIGNIFICANT CHANGE UP (ref 0.5–1.3)
EOSINOPHIL # BLD AUTO: 0.31 K/UL — SIGNIFICANT CHANGE UP (ref 0–0.5)
EOSINOPHIL NFR BLD AUTO: 3.2 % — SIGNIFICANT CHANGE UP (ref 0–6)
GLUCOSE SERPL-MCNC: 105 MG/DL — HIGH (ref 70–99)
HCT VFR BLD CALC: 38 % — SIGNIFICANT CHANGE UP (ref 34.5–45)
HGB BLD-MCNC: 12.5 G/DL — SIGNIFICANT CHANGE UP (ref 11.5–15.5)
IMM GRANULOCYTES # BLD AUTO: 0.11 # — SIGNIFICANT CHANGE UP
IMM GRANULOCYTES NFR BLD AUTO: 1.1 % — SIGNIFICANT CHANGE UP (ref 0–1.5)
LYMPHOCYTES # BLD AUTO: 1.65 K/UL — SIGNIFICANT CHANGE UP (ref 1–3.3)
LYMPHOCYTES # BLD AUTO: 16.9 % — SIGNIFICANT CHANGE UP (ref 13–44)
MAGNESIUM SERPL-MCNC: 2.3 MG/DL — SIGNIFICANT CHANGE UP (ref 1.6–2.6)
MCHC RBC-ENTMCNC: 29.1 PG — SIGNIFICANT CHANGE UP (ref 27–34)
MCHC RBC-ENTMCNC: 32.9 % — SIGNIFICANT CHANGE UP (ref 32–36)
MCV RBC AUTO: 88.4 FL — SIGNIFICANT CHANGE UP (ref 80–100)
MONOCYTES # BLD AUTO: 0.73 K/UL — SIGNIFICANT CHANGE UP (ref 0–0.9)
MONOCYTES NFR BLD AUTO: 7.5 % — SIGNIFICANT CHANGE UP (ref 2–14)
NEUTROPHILS # BLD AUTO: 6.89 K/UL — SIGNIFICANT CHANGE UP (ref 1.8–7.4)
NEUTROPHILS NFR BLD AUTO: 70.7 % — SIGNIFICANT CHANGE UP (ref 43–77)
NRBC # FLD: 0 — SIGNIFICANT CHANGE UP
PLATELET # BLD AUTO: 524 K/UL — HIGH (ref 150–400)
PMV BLD: 11.1 FL — SIGNIFICANT CHANGE UP (ref 7–13)
POTASSIUM SERPL-MCNC: 3.3 MMOL/L — LOW (ref 3.5–5.3)
POTASSIUM SERPL-SCNC: 3.3 MMOL/L — LOW (ref 3.5–5.3)
RBC # BLD: 4.3 M/UL — SIGNIFICANT CHANGE UP (ref 3.8–5.2)
RBC # FLD: 14.7 % — HIGH (ref 10.3–14.5)
SODIUM SERPL-SCNC: 144 MMOL/L — SIGNIFICANT CHANGE UP (ref 135–145)
WBC # BLD: 9.75 K/UL — SIGNIFICANT CHANGE UP (ref 3.8–10.5)
WBC # FLD AUTO: 9.75 K/UL — SIGNIFICANT CHANGE UP (ref 3.8–10.5)

## 2017-12-15 RX ORDER — POTASSIUM CHLORIDE 20 MEQ
40 PACKET (EA) ORAL ONCE
Qty: 0 | Refills: 0 | Status: COMPLETED | OUTPATIENT
Start: 2017-12-15 | End: 2017-12-15

## 2017-12-15 RX ORDER — CEPHALEXIN 500 MG
500 CAPSULE ORAL
Qty: 56 | Refills: 0 | OUTPATIENT
Start: 2017-12-15 | End: 2017-12-28

## 2017-12-15 RX ADMIN — PANTOPRAZOLE SODIUM 40 MILLIGRAM(S): 20 TABLET, DELAYED RELEASE ORAL at 06:02

## 2017-12-15 RX ADMIN — Medication 166.67 MILLIGRAM(S): at 06:35

## 2017-12-15 RX ADMIN — Medication 40 MILLIEQUIVALENT(S): at 11:09

## 2017-12-15 RX ADMIN — Medication 81 MILLIGRAM(S): at 11:09

## 2017-12-15 RX ADMIN — VALSARTAN 320 MILLIGRAM(S): 80 TABLET ORAL at 05:57

## 2017-12-15 RX ADMIN — CEFEPIME 100 MILLIGRAM(S): 1 INJECTION, POWDER, FOR SOLUTION INTRAMUSCULAR; INTRAVENOUS at 17:38

## 2017-12-15 RX ADMIN — Medication 166.67 MILLIGRAM(S): at 18:22

## 2017-12-15 RX ADMIN — Medication 100 MILLIGRAM(S): at 11:09

## 2017-12-15 RX ADMIN — AMLODIPINE BESYLATE 10 MILLIGRAM(S): 2.5 TABLET ORAL at 05:56

## 2017-12-15 RX ADMIN — CEFEPIME 100 MILLIGRAM(S): 1 INJECTION, POWDER, FOR SOLUTION INTRAMUSCULAR; INTRAVENOUS at 05:56

## 2017-12-15 RX ADMIN — Medication 25 MILLIGRAM(S): at 05:56

## 2017-12-15 NOTE — DISCHARGE NOTE ADULT - CARE PROVIDER_API CALL
Rosette Hunter), Surgery  [address] [place] [address]  Phone: [phone]  Fax: [phone]

## 2017-12-15 NOTE — DISCHARGE NOTE ADULT - INSTRUCTIONS
Resume regular diet as tolerated.  Only low impact activity, such as walking.  No strenuous exercise or heavy lifting Patient instructed to notify in case od increase in discharge from the site ,fever ,nausea and vomiting

## 2017-12-15 NOTE — DISCHARGE NOTE ADULT - HOSPITAL COURSE
Patient is a 47 F, recently discharged from the hospital following an ex lap and small bowel resection for an incarcerated ventral hernia, who returned to the ED with serosanguinous drainage from the midline wound with surrounding erythema.  CT performed in the ED was significant for "Open midline incision with inflammatory changes in the surrounding fat, No focal fluid collection."  A few of the inferior staples were removed and the incision was opened.  Serosanguinous fluid was drained, and the wound was packed with a wet to dry dressing.  The patient tolerated it well.  She was also started on IV antibiotics.  On HD 2, she was able to tolerated dressing change without additional pain medication.  She had received 24 hours of IV antibiotics. VNS was set up to aid with dressing changes.  At the time of discharge, the patient was hemodynamically stable, was tolerating PO diet, was voiding urine and passing stool, was ambulating, and was comfortable with adequate pain control.

## 2017-12-15 NOTE — DISCHARGE NOTE ADULT - MEDICATION SUMMARY - MEDICATIONS TO TAKE
I will START or STAY ON the medications listed below when I get home from the hospital:    aspirin 81 mg oral tablet  -- 1 tab(s) by mouth once a day  -- Indication: For anti platelet     Percocet 5/325 oral tablet  -- 1 tab(s) by mouth every 6 hours, As Needed -for severe pain MDD:4   -- Caution federal law prohibits the transfer of this drug to any person other  than the person for whom it was prescribed.  May cause drowsiness.  Alcohol may intensify this effect.  Use care when operating dangerous machinery.  This prescription cannot be refilled.  This product contains acetaminophen.  Do not use  with any other product containing acetaminophen to prevent possible liver damage.  Using more of this medication than prescribed may cause serious breathing problems.    -- Indication: For Pain    valsartan-hydrochlorothiazide 320mg-25mg oral tablet  -- 1 tab(s) by mouth once a day  -- Indication: For hypertension    amLODIPine 10 mg oral tablet  -- 1 tab(s) by mouth once a day  -- Indication: For hypertension    Colace 100 mg oral capsule  -- 1 cap(s) by mouth once a day, As Needed  -- Indication: For constipation    MiraLax oral powder for reconstitution  -- Indication: For constipation    omeprazole 40 mg oral delayed release capsule  -- 1 cap(s) by mouth once a day  -- Indication: For reflux

## 2017-12-15 NOTE — DISCHARGE NOTE ADULT - PLAN OF CARE
wound healing VNS will be coming daily to aid with dressing changes.  Wound should be packed with a damp kerlex, and covered with dry gauze, and abd pad, and paper tape.  This dressing should be changed once a day.  To shower, remove dressing first.  Allow water to run over wound, do not scrub, pat dry.  Replace with a clean dressing once shower is finished.

## 2017-12-15 NOTE — PROGRESS NOTE ADULT - SUBJECTIVE AND OBJECTIVE BOX
Surgery Progress Note    S: Patient resting in bed. Pain is well controlled.  She tolerated a dressing change without additional pain medication.      T(C): 36.9 (12-15-17 @ 05:46), Max: 36.9 (12-15-17 @ 05:46)  HR: 73 (12-15-17 @ 05:46) (71 - 80)  BP: 133/84 (12-15-17 @ 05:46) (120/71 - 133/90)  RR: 17 (12-15-17 @ 05:46) (17 - 20)  SpO2: 96% (12-15-17 @ 05:46) (96% - 99%)  Wt(kg): --    12-14 @ 07:01  -  12-15 @ 07:00  --------------------------------------------------------  IN:    IV PiggyBack: 300 mL  Total IN: 300 mL    OUT:    Voided: 400 mL  Total OUT: 400 mL    Total NET: -100 mL                              12.5   9.75  )-----------( 524      ( 15 Dec 2017 06:15 )             38.0     CAPILLARY BLOOD GLUCOSE          PE:   Gen: AAOX 3, NAD  Abd: Soft, NT, ND, midline incision partially opened, wound goes about a fist deep into the belly, fascia intact, no fistula or abscess, draining serosanguinous fluid      A/P: 47yFemale s/p SBR for incarcerated ventral hernia, now returned to hospital with infected midline wound  - diet: regular  - pain control  - daily wet to dry dressing changes  - 24 hours IV abx  - dispo: home with home PT

## 2017-12-15 NOTE — DISCHARGE NOTE ADULT - CONDITIONS AT DISCHARGE
Patient alert and orientedx4. Patient OOB ambulates, dressing dry and intact .Patient denies pain ,tolerating diet well. PIV removed

## 2017-12-18 PROBLEM — K43.6 OTHER AND UNSPECIFIED VENTRAL HERNIA WITH OBSTRUCTION, WITHOUT GANGRENE: Chronic | Status: ACTIVE | Noted: 2017-12-14

## 2017-12-19 ENCOUNTER — APPOINTMENT (OUTPATIENT)
Dept: TRAUMA SURGERY | Facility: CLINIC | Age: 47
End: 2017-12-19
Payer: COMMERCIAL

## 2017-12-19 VITALS
DIASTOLIC BLOOD PRESSURE: 81 MMHG | BODY MASS INDEX: 36.15 KG/M2 | HEART RATE: 71 BPM | SYSTOLIC BLOOD PRESSURE: 122 MMHG | HEIGHT: 65 IN | WEIGHT: 217 LBS | TEMPERATURE: 97.8 F

## 2017-12-19 PROCEDURE — 99024 POSTOP FOLLOW-UP VISIT: CPT

## 2017-12-28 ENCOUNTER — APPOINTMENT (OUTPATIENT)
Dept: TRAUMA SURGERY | Facility: CLINIC | Age: 47
End: 2017-12-28
Payer: COMMERCIAL

## 2017-12-28 VITALS
HEART RATE: 77 BPM | TEMPERATURE: 98.1 F | DIASTOLIC BLOOD PRESSURE: 77 MMHG | WEIGHT: 217 LBS | HEIGHT: 65 IN | SYSTOLIC BLOOD PRESSURE: 118 MMHG | BODY MASS INDEX: 36.15 KG/M2

## 2017-12-28 PROCEDURE — 99024 POSTOP FOLLOW-UP VISIT: CPT

## 2018-01-02 LAB — FUNGUS SPEC QL CULT: SIGNIFICANT CHANGE UP

## 2018-01-12 ENCOUNTER — APPOINTMENT (OUTPATIENT)
Dept: TRAUMA SURGERY | Facility: CLINIC | Age: 48
End: 2018-01-12
Payer: COMMERCIAL

## 2018-01-12 VITALS
DIASTOLIC BLOOD PRESSURE: 83 MMHG | WEIGHT: 217 LBS | SYSTOLIC BLOOD PRESSURE: 123 MMHG | HEIGHT: 65 IN | TEMPERATURE: 98.5 F | BODY MASS INDEX: 36.15 KG/M2 | HEART RATE: 60 BPM

## 2018-01-12 PROCEDURE — 99024 POSTOP FOLLOW-UP VISIT: CPT

## 2018-02-08 ENCOUNTER — APPOINTMENT (OUTPATIENT)
Dept: TRAUMA SURGERY | Facility: CLINIC | Age: 48
End: 2018-02-08
Payer: COMMERCIAL

## 2018-02-08 VITALS
SYSTOLIC BLOOD PRESSURE: 123 MMHG | WEIGHT: 204 LBS | BODY MASS INDEX: 33.99 KG/M2 | HEART RATE: 62 BPM | DIASTOLIC BLOOD PRESSURE: 75 MMHG | HEIGHT: 65 IN | TEMPERATURE: 98.2 F

## 2018-02-08 PROCEDURE — 99024 POSTOP FOLLOW-UP VISIT: CPT

## 2018-06-17 NOTE — DISCHARGE NOTE ADULT - CARE PLAN
Depression    Diabetes    Hyperthyroidism    PCOS (polycystic ovarian syndrome) Principal Discharge DX:	Postoperative wound infection, initial encounter  Goal:	wound healing  Instructions for follow-up, activity and diet:	VNS will be coming daily to aid with dressing changes.  Wound should be packed with a damp kerlex, and covered with dry gauze, and abd pad, and paper tape.  This dressing should be changed once a day.  To shower, remove dressing first.  Allow water to run over wound, do not scrub, pat dry.  Replace with a clean dressing once shower is finished.

## 2020-05-28 ENCOUNTER — EMERGENCY (EMERGENCY)
Facility: HOSPITAL | Age: 50
LOS: 1 days | Discharge: HOME | End: 2020-05-28
Attending: EMERGENCY MEDICINE
Payer: COMMERCIAL

## 2020-05-28 VITALS
TEMPERATURE: 98 F | WEIGHT: 220.02 LBS | OXYGEN SATURATION: 80 % | RESPIRATION RATE: 18 BRPM | SYSTOLIC BLOOD PRESSURE: 190 MMHG | HEIGHT: 66 IN | HEART RATE: 80 BPM | DIASTOLIC BLOOD PRESSURE: 95 MMHG

## 2020-05-28 VITALS
DIASTOLIC BLOOD PRESSURE: 83 MMHG | OXYGEN SATURATION: 97 % | HEART RATE: 74 BPM | TEMPERATURE: 98 F | RESPIRATION RATE: 18 BRPM | SYSTOLIC BLOOD PRESSURE: 170 MMHG

## 2020-05-28 DIAGNOSIS — Z90.49 ACQUIRED ABSENCE OF OTHER SPECIFIED PARTS OF DIGESTIVE TRACT: Chronic | ICD-10-CM

## 2020-05-28 DIAGNOSIS — N80.9 ENDOMETRIOSIS, UNSPECIFIED: Chronic | ICD-10-CM

## 2020-05-28 DIAGNOSIS — Z98.890 OTHER SPECIFIED POSTPROCEDURAL STATES: Chronic | ICD-10-CM

## 2020-05-28 LAB
ALBUMIN SERPL ELPH-MCNC: 4.3 G/DL — SIGNIFICANT CHANGE UP (ref 3.3–5)
ALP SERPL-CCNC: 86 U/L — SIGNIFICANT CHANGE UP (ref 40–120)
ALT FLD-CCNC: 27 U/L — SIGNIFICANT CHANGE UP (ref 10–45)
ANION GAP SERPL CALC-SCNC: 15 MMOL/L — SIGNIFICANT CHANGE UP (ref 5–17)
APPEARANCE UR: ABNORMAL
APTT BLD: 32 SEC — SIGNIFICANT CHANGE UP (ref 27.5–36.3)
AST SERPL-CCNC: 16 U/L — SIGNIFICANT CHANGE UP (ref 10–40)
BACTERIA # UR AUTO: NEGATIVE — SIGNIFICANT CHANGE UP
BASE EXCESS BLDV CALC-SCNC: 3 MMOL/L — HIGH (ref -2–2)
BASOPHILS # BLD AUTO: 0.12 K/UL — SIGNIFICANT CHANGE UP (ref 0–0.2)
BASOPHILS NFR BLD AUTO: 0.9 % — SIGNIFICANT CHANGE UP (ref 0–2)
BILIRUB SERPL-MCNC: 0.5 MG/DL — SIGNIFICANT CHANGE UP (ref 0.2–1.2)
BILIRUB UR-MCNC: NEGATIVE — SIGNIFICANT CHANGE UP
BUN SERPL-MCNC: 18 MG/DL — SIGNIFICANT CHANGE UP (ref 7–23)
CA-I SERPL-SCNC: 1.29 MMOL/L — SIGNIFICANT CHANGE UP (ref 1.12–1.3)
CALCIUM SERPL-MCNC: 10.6 MG/DL — HIGH (ref 8.4–10.5)
CHLORIDE BLDV-SCNC: 107 MMOL/L — SIGNIFICANT CHANGE UP (ref 96–108)
CHLORIDE SERPL-SCNC: 106 MMOL/L — SIGNIFICANT CHANGE UP (ref 96–108)
CO2 BLDV-SCNC: 28 MMOL/L — SIGNIFICANT CHANGE UP (ref 22–30)
CO2 SERPL-SCNC: 23 MMOL/L — SIGNIFICANT CHANGE UP (ref 22–31)
COLOR SPEC: YELLOW — SIGNIFICANT CHANGE UP
CREAT SERPL-MCNC: 0.95 MG/DL — SIGNIFICANT CHANGE UP (ref 0.5–1.3)
DIFF PNL FLD: ABNORMAL
EOSINOPHIL # BLD AUTO: 0.05 K/UL — SIGNIFICANT CHANGE UP (ref 0–0.5)
EOSINOPHIL NFR BLD AUTO: 0.4 % — SIGNIFICANT CHANGE UP (ref 0–6)
EPI CELLS # UR: 4 /HPF — SIGNIFICANT CHANGE UP
GAS PNL BLDV: 143 MMOL/L — SIGNIFICANT CHANGE UP (ref 135–145)
GAS PNL BLDV: SIGNIFICANT CHANGE UP
GLUCOSE BLDV-MCNC: 108 MG/DL — HIGH (ref 70–99)
GLUCOSE SERPL-MCNC: 113 MG/DL — HIGH (ref 70–99)
GLUCOSE UR QL: NEGATIVE — SIGNIFICANT CHANGE UP
HCO3 BLDV-SCNC: 27 MMOL/L — SIGNIFICANT CHANGE UP (ref 21–29)
HCT VFR BLD CALC: 43 % — SIGNIFICANT CHANGE UP (ref 34.5–45)
HCT VFR BLDA CALC: 42 % — SIGNIFICANT CHANGE UP (ref 39–50)
HGB BLD CALC-MCNC: 13.6 G/DL — SIGNIFICANT CHANGE UP (ref 11.5–15.5)
HGB BLD-MCNC: 13.4 G/DL — SIGNIFICANT CHANGE UP (ref 11.5–15.5)
HYALINE CASTS # UR AUTO: 1 /LPF — SIGNIFICANT CHANGE UP (ref 0–2)
IMM GRANULOCYTES NFR BLD AUTO: 0.4 % — SIGNIFICANT CHANGE UP (ref 0–1.5)
INR BLD: 1.13 RATIO — SIGNIFICANT CHANGE UP (ref 0.88–1.16)
KETONES UR-MCNC: ABNORMAL
LACTATE BLDV-MCNC: 1.3 MMOL/L — SIGNIFICANT CHANGE UP (ref 0.7–2)
LEUKOCYTE ESTERASE UR-ACNC: ABNORMAL
LIDOCAIN IGE QN: 20 U/L — SIGNIFICANT CHANGE UP (ref 7–60)
LYMPHOCYTES # BLD AUTO: 1.66 K/UL — SIGNIFICANT CHANGE UP (ref 1–3.3)
LYMPHOCYTES # BLD AUTO: 12.4 % — LOW (ref 13–44)
MCHC RBC-ENTMCNC: 26.9 PG — LOW (ref 27–34)
MCHC RBC-ENTMCNC: 31.2 GM/DL — LOW (ref 32–36)
MCV RBC AUTO: 86.3 FL — SIGNIFICANT CHANGE UP (ref 80–100)
MONOCYTES # BLD AUTO: 0.79 K/UL — SIGNIFICANT CHANGE UP (ref 0–0.9)
MONOCYTES NFR BLD AUTO: 5.9 % — SIGNIFICANT CHANGE UP (ref 2–14)
NEUTROPHILS # BLD AUTO: 10.71 K/UL — HIGH (ref 1.8–7.4)
NEUTROPHILS NFR BLD AUTO: 80 % — HIGH (ref 43–77)
NITRITE UR-MCNC: NEGATIVE — SIGNIFICANT CHANGE UP
NRBC # BLD: 0 /100 WBCS — SIGNIFICANT CHANGE UP (ref 0–0)
OTHER CELLS CSF MANUAL: 15 ML/DL — LOW (ref 18–22)
PCO2 BLDV: 40 MMHG — SIGNIFICANT CHANGE UP (ref 35–50)
PH BLDV: 7.44 — SIGNIFICANT CHANGE UP (ref 7.35–7.45)
PH UR: 6 — SIGNIFICANT CHANGE UP (ref 5–8)
PLATELET # BLD AUTO: 317 K/UL — SIGNIFICANT CHANGE UP (ref 150–400)
PO2 BLDV: 46 MMHG — HIGH (ref 25–45)
POTASSIUM BLDV-SCNC: 2.9 MMOL/L — CRITICAL LOW (ref 3.5–5.3)
POTASSIUM SERPL-MCNC: 3 MMOL/L — LOW (ref 3.5–5.3)
POTASSIUM SERPL-SCNC: 3 MMOL/L — LOW (ref 3.5–5.3)
PROT SERPL-MCNC: 7.7 G/DL — SIGNIFICANT CHANGE UP (ref 6–8.3)
PROT UR-MCNC: ABNORMAL
PROTHROM AB SERPL-ACNC: 13 SEC — HIGH (ref 10–12.9)
RBC # BLD: 4.98 M/UL — SIGNIFICANT CHANGE UP (ref 3.8–5.2)
RBC # FLD: 13.5 % — SIGNIFICANT CHANGE UP (ref 10.3–14.5)
RBC CASTS # UR COMP ASSIST: 23 /HPF — HIGH (ref 0–4)
SAO2 % BLDV: 79 % — SIGNIFICANT CHANGE UP (ref 67–88)
SODIUM SERPL-SCNC: 144 MMOL/L — SIGNIFICANT CHANGE UP (ref 135–145)
SP GR SPEC: 1.03 — HIGH (ref 1.01–1.02)
UROBILINOGEN FLD QL: ABNORMAL
WBC # BLD: 13.39 K/UL — HIGH (ref 3.8–10.5)
WBC # FLD AUTO: 13.39 K/UL — HIGH (ref 3.8–10.5)
WBC UR QL: 8 /HPF — HIGH (ref 0–5)

## 2020-05-28 PROCEDURE — 82803 BLOOD GASES ANY COMBINATION: CPT

## 2020-05-28 PROCEDURE — 99285 EMERGENCY DEPT VISIT HI MDM: CPT

## 2020-05-28 PROCEDURE — 82947 ASSAY GLUCOSE BLOOD QUANT: CPT

## 2020-05-28 PROCEDURE — 85027 COMPLETE CBC AUTOMATED: CPT

## 2020-05-28 PROCEDURE — 83605 ASSAY OF LACTIC ACID: CPT

## 2020-05-28 PROCEDURE — 99284 EMERGENCY DEPT VISIT MOD MDM: CPT

## 2020-05-28 PROCEDURE — 81001 URINALYSIS AUTO W/SCOPE: CPT

## 2020-05-28 PROCEDURE — 83690 ASSAY OF LIPASE: CPT

## 2020-05-28 PROCEDURE — 74177 CT ABD & PELVIS W/CONTRAST: CPT

## 2020-05-28 PROCEDURE — 80053 COMPREHEN METABOLIC PANEL: CPT

## 2020-05-28 PROCEDURE — 82435 ASSAY OF BLOOD CHLORIDE: CPT

## 2020-05-28 PROCEDURE — 84132 ASSAY OF SERUM POTASSIUM: CPT

## 2020-05-28 PROCEDURE — 87086 URINE CULTURE/COLONY COUNT: CPT

## 2020-05-28 PROCEDURE — 85014 HEMATOCRIT: CPT

## 2020-05-28 PROCEDURE — 85730 THROMBOPLASTIN TIME PARTIAL: CPT

## 2020-05-28 PROCEDURE — 84295 ASSAY OF SERUM SODIUM: CPT

## 2020-05-28 PROCEDURE — 84702 CHORIONIC GONADOTROPIN TEST: CPT

## 2020-05-28 PROCEDURE — 82330 ASSAY OF CALCIUM: CPT

## 2020-05-28 PROCEDURE — 85610 PROTHROMBIN TIME: CPT

## 2020-05-28 PROCEDURE — 74177 CT ABD & PELVIS W/CONTRAST: CPT | Mod: 26

## 2020-05-28 RX ORDER — POTASSIUM CHLORIDE 20 MEQ
40 PACKET (EA) ORAL ONCE
Refills: 0 | Status: COMPLETED | OUTPATIENT
Start: 2020-05-28 | End: 2020-05-28

## 2020-05-28 RX ADMIN — Medication 40 MILLIEQUIVALENT(S): at 14:07

## 2020-05-28 NOTE — ED ADULT TRIAGE NOTE - CHIEF COMPLAINT QUOTE
abdominal pain and bloating x1.5 weeks, intermittent with pain worsening pain today associated with nausea and passing gas-denies fevers, chills   abdominal surgeries in past abdominal pain and bloating x1.5 weeks, intermittent with pain worsening today associated with nausea- denies fevers, chills, V/D. taking stool softeners   abdominal surgeries in past

## 2020-05-28 NOTE — ED PROVIDER NOTE - ATTENDING CONTRIBUTION TO CARE
Private Physician Risa Ragland PCP/[facility]  49y female pmh HTN comes to ed complains of abd pain past week with feeling of constipation, symptoms waxed and waned. Pt complains of "recurrence of pain upon driving to work" Pain worsened to spite bm, No fever chills. shortness of breath,cp,cough.gi bleeidng. Hx of ovarian cyst resection, with small bowel obstruction, pe well developed and well nourished FEMALE Obese nad normocephalic atraumatic neck supple chest clear anterior & posterior abd soft +bs no mass guarding cv no rubs, gallops or murmurs neuro no focal defects  José Miguel Leblanc MD, Facep

## 2020-05-28 NOTE — ED PROVIDER NOTE - NS ED ROS FT
GENERAL: no fever, no chills  EYES: no change in vision, no irritation, no discharge, no redness, no pain  HEENT: no trouble swallowing or speaking  CARDIAC: no chest pain, no palpitations   PULMONARY: no cough, no shortness of breath, no wheezing  GI: +abdominal pressure, +distention, no nausea, no vomiting, no diarrhea, +constipation, no melena, no hematochezia, no hematemesis  : no changes in urination, no dysuria  SKIN: no rashes  NEURO: no headache, no numbness, no weakness  MSK: no joint pain, no muscle pain, +back pain, no calf pain     -Abraham Vega, PGY-1

## 2020-05-28 NOTE — ED PROVIDER NOTE - PATIENT PORTAL LINK FT
You can access the FollowMyHealth Patient Portal offered by [facility] by registering at the following website: [email or web address]. By joining "[employer]"’s FollowMyHealth portal, you will also be able to view your health information using other applications (apps) compatible with our system.

## 2020-05-28 NOTE — ED PROVIDER NOTE - OBJECTIVE STATEMENT
49 year old female PMH HTN p/w diffuse abd pressure x1 week. Patient notes difficulty having BM, going "in small amounts". Went to work today and felt strong urge to have BM but was unable to go. Taking Miralax and Colace w/ minimal relief. States the pressure is intermittent, comes in waves and lasts minutes to hours, currently rated as 1/10. Pain started radiating to back today. Patient had SBO in 2017 w/ subsequent resection but this pressure feels different. Patient tolerating PO, decreased flatus x1 week. Denies F/C, cp, sob, nausea, vomiting, diarrhea, dysuria, weakness, or numbness/tingling. LMP "many years ago".    PCP- Risa

## 2020-05-28 NOTE — ED PROVIDER NOTE - CARE PROVIDER_API CALL
Clifton Go  UROLOGY  [address] [place] [address]  Phone: [phone]  Fax: [phone]  Follow Up Time: Routine

## 2020-05-28 NOTE — ED PROVIDER NOTE - PHYSICAL EXAMINATION
GENERAL: A&Ox3, non-toxic appearing, no acute distress  HEENT: NCAT, EOMI, oral mucosa moist, normal conjunctiva  RESP: CTAB, no respiratory distress, no wheezes/rhonchi/rales, speaking in full sentences  CV: RRR, no murmurs/rubs/gallops  ABDOMEN: soft, non-tender, non-distended, no guarding, no rebound, no CVA tenderness  MSK: no visible deformities  NEURO: no focal sensory or motor deficits, PARK, steady gait  SKIN: warm, normal color, well perfused, no rash  PSYCH: normal affect    -Abraham Vega, PGY-1

## 2020-05-28 NOTE — ED ADULT NURSE NOTE - CHIEF COMPLAINT QUOTE
abdominal pain and bloating x1.5 weeks, intermittent with pain worsening today associated with nausea- denies fevers, chills, V/D. taking stool softeners   abdominal surgeries in past

## 2020-05-28 NOTE — ED PROVIDER NOTE - PROGRESS NOTE DETAILS
Brooke, pgy3: very mild elevation of hcg on serum quant (6.1, upper maker of negative is 5), pt denies any chance that she is pregnant, result likely false positive, pt is comfortable receiving CT scan, CT tech made aware Endorsed to Dr Lamont Leblanc MD, Facep DH: CT showing multiple non-incarcerated hernias, no SBO, 2mm UVJ stone. UA borderline for infection. Patient not clinically showing signs of infection, afebrile, non-tachy, patient tolerating PO. Will d/w urology for follow up and abx. Urology paged. DH: CT showing multiple non-incarcerated hernias, no SBO, 2mm UVJ stone. WBC in UA likely reactive to stone. Patient not clinically showing signs of infection, afebrile, non-tachy, patient tolerating PO. Patient to follow up w/ urology regarding symptoms. Discussed return precautions and all questions answered. Pt in agreement w/ plan. CAOx3, NAD, VSS. Stable for d/c.

## 2020-05-28 NOTE — ED ADULT NURSE REASSESSMENT NOTE - NS ED NURSE REASSESS COMMENT FT1
Pt AAOx4, NAD, resp nonlabored, resting comfortably in bed with call bell at bedside. Pt states that her pain has decreased with rest in the ED. Pt denies headache, dizziness, chest pain, palpitations, SOB, abd pain, n/v/d, fevers, chills, weakness at this time. Pt awaiting CT. Safety maintained.
Pt tolerated PO crackers and fluids well. No nausea/vomitting/abdominal pain. Pt states that she is currently in no pain. Pt understands CT results and plan of care. Pt is resting comfortably in bed, NAD, VSS, safety maintained.

## 2020-05-28 NOTE — ED PROVIDER NOTE - NSFOLLOWUPINSTRUCTIONS_ED_ALL_ED_FT
You have been diagnosed with a kidney stone. To control the pain, you should take Ibuprofen 600 mg along with Tylenol 650mg every 6 hours. These are both over the counter medications that you can  at your local pharmacy without a prescription. If you are still having severe pain in 48 hours you should return to the emergency room or a urologist if able. If you began having fever, uncontrollable nausea and vomiting then you also need to come back to the ED.     Please follow up with urology regarding your renal stone. a copy of your results is attached to your discharge.     Rest, drink plenty of fluids.  Advance activity as tolerated.  Continue all previously prescribed medications as directed.  Follow up with your primary care physician in 48-72 hours- bring copies of your results.  Return to the ER for worsening or persistent symptoms, and/or ANY NEW OR CONCERNING SYMPTOMS. If you have issues obtaining follow up, please call: [phone] to obtain a doctor or specialist who takes your insurance in your area.

## 2020-05-28 NOTE — ED PROVIDER NOTE - CLINICAL SUMMARY MEDICAL DECISION MAKING FREE TEXT BOX
Abraham Vega, PGY1: 49 year old female PMH HTN p/w diffuse abd pressure x1 week, +constipation, having BM in small amounts, decreased flatus. Patient w/ hx SBO 2017 w/ resection. Tolerating PO, no F/C, no N/V. VSS, SpO2 99% despite showing 80% in triage. C/f SBO vs partial SBO, constipation. Plan for labs, CT A/P w/ IV&PO, reassess. Abraham Vega, PGY1: 49 year old female PMH HTN p/w diffuse abd pressure x1 week, +constipation, having BM in small amounts, decreased flatus. Patient w/ hx SBO 2017 w/ resection. Tolerating PO, no F/C, no N/V. VSS, SpO2 99% despite documented 80% in triage. Abd exam w/o focal findings. C/f SBO vs partial SBO, constipation. Plan for labs, CT A/P w/ IV&PO, reassess.

## 2020-05-28 NOTE — ED ADULT NURSE NOTE - OBJECTIVE STATEMENT
49y F coming in from home for abdominal discomfort. Abdominal discomfort began a week and a half ago, associated with constipation. Pt attempted to take a "gas-x, Miralax, and a stool softener" with only minimal stool output. The abdominal discomfort is intermitted with nausea. Abdomen is nontender on palpation. Pt denies diarrhea or vomiting. Pt denies any fever or chills. Denies any COVID contact. Pt is alert and oriented able to move all extremities. No SOB, free from any respiratory distress noted.

## 2020-05-29 PROBLEM — I10 ESSENTIAL (PRIMARY) HYPERTENSION: Chronic | Status: ACTIVE | Noted: 2017-12-05

## 2020-05-29 PROBLEM — K21.0 GASTRO-ESOPHAGEAL REFLUX DISEASE WITH ESOPHAGITIS: Chronic | Status: ACTIVE | Noted: 2017-12-05

## 2020-05-29 PROBLEM — L03.119 CELLULITIS OF UNSPECIFIED PART OF LIMB: Chronic | Status: ACTIVE | Noted: 2017-12-05

## 2020-05-29 LAB
CULTURE RESULTS: SIGNIFICANT CHANGE UP
SPECIMEN SOURCE: SIGNIFICANT CHANGE UP

## 2020-10-02 PROBLEM — I10 ESSENTIAL (PRIMARY) HYPERTENSION: Chronic | Status: ACTIVE | Noted: 2020-05-28

## 2020-10-28 ENCOUNTER — APPOINTMENT (OUTPATIENT)
Dept: WOUND CARE | Facility: CLINIC | Age: 50
End: 2020-10-28
Payer: COMMERCIAL

## 2020-10-28 VITALS
HEART RATE: 88 BPM | DIASTOLIC BLOOD PRESSURE: 87 MMHG | TEMPERATURE: 97.7 F | BODY MASS INDEX: 38.32 KG/M2 | WEIGHT: 230 LBS | HEIGHT: 65 IN | RESPIRATION RATE: 16 BRPM | SYSTOLIC BLOOD PRESSURE: 156 MMHG

## 2020-10-28 PROCEDURE — 11042 DBRDMT SUBQ TIS 1ST 20SQCM/<: CPT

## 2020-10-28 PROCEDURE — 11045 DBRDMT SUBQ TISS EACH ADDL: CPT

## 2020-10-28 PROCEDURE — 99072 ADDL SUPL MATRL&STAF TM PHE: CPT

## 2020-10-28 PROCEDURE — 99204 OFFICE O/P NEW MOD 45 MIN: CPT | Mod: 57

## 2020-10-28 RX ORDER — COLLAGENASE SANTYL 250 [ARB'U]/G
250 OINTMENT TOPICAL DAILY
Qty: 1 | Refills: 3 | Status: DISCONTINUED | COMMUNITY
Start: 2017-07-31 | End: 2020-10-28

## 2020-10-28 RX ORDER — TRIAMCINOLONE ACETONIDE 1 MG/G
0.1 OINTMENT TOPICAL TWICE DAILY
Qty: 1 | Refills: 1 | Status: DISCONTINUED | COMMUNITY
Start: 2017-11-03 | End: 2020-10-28

## 2020-10-28 RX ORDER — CIPROFLOXACIN HYDROCHLORIDE 500 MG/1
500 TABLET, FILM COATED ORAL
Qty: 14 | Refills: 0 | Status: COMPLETED | COMMUNITY
Start: 2017-08-28 | End: 2020-10-28

## 2020-10-28 RX ORDER — ATORVASTATIN CALCIUM 20 MG/1
20 TABLET, FILM COATED ORAL
Refills: 0 | Status: ACTIVE | COMMUNITY

## 2020-10-28 RX ORDER — SODIUM HYPOCHLORITE 1.25 MG/ML
0.12 SOLUTION TOPICAL
Qty: 1 | Refills: 3 | Status: DISCONTINUED | COMMUNITY
Start: 2017-08-23 | End: 2020-10-28

## 2020-10-28 RX ORDER — VALSARTAN AND HYDROCHLOROTHIAZIDE 320; 25 MG/1; MG/1
320-25 TABLET, FILM COATED ORAL
Refills: 0 | Status: DISCONTINUED | COMMUNITY
End: 2020-10-28

## 2020-10-28 RX ORDER — SULFAMETHOXAZOLE AND TRIMETHOPRIM 800; 160 MG/1; MG/1
800-160 TABLET ORAL
Qty: 28 | Refills: 0 | Status: DISCONTINUED | COMMUNITY
Start: 2017-12-28 | End: 2020-10-28

## 2020-10-28 RX ORDER — NYSTATIN 100MM UNIT
POWDER (EA) MISCELLANEOUS
Qty: 1 | Refills: 1 | Status: DISCONTINUED | COMMUNITY
Start: 2017-12-26 | End: 2020-10-28

## 2020-11-03 NOTE — PLAN
[FreeTextEntry1] : Wound Assessment and Plan:\par Apply lidocaine or topical anesthetic if needed to reduce pain upon washing the wound.\par Wash wound with ----0.9% saline/ Dakins 0.125% or Dove skin sensitive soap and clean water \par Apply ---- bryan  and /foam/\par Apply ----compression stocking or ACE\par Change dressing ---daily\par Leg elevation as tolerated\par Encouraged ambulation or exercise.\par Optimization of nutrition.\par \par -----Wound supplies ordered via DME\par Patient given contact information to DME\par \par -----Homecare orders in place\par \par Follow up appointment scheduled for 1 week\par \par TeleHealth Services discussed with the patient and/or family.  Discharge instructions given including download of [facility] information regarding:\par 1)  [employer] [facility] to obtain medical records\par 2)  [employer] [facility] to conduct Face-to-Face TeleHealth visit\par 3)  [employer] for the Patient (patient takes a picture of their wound which is sent to the patient's chart for review)\par \par

## 2020-11-03 NOTE — HISTORY OF PRESENT ILLNESS
[FreeTextEntry1] : Ms. MAGDIEL BEAN   presents to the office with a wound since April of 2020 after bumping her leg in her office at work. The wound is located on  the left leg. The patient has complaints of pain.  The patient has been dressing the wound with gauze and valeriano.  The patient denies fevers or chills. The patient has localized pain to the wound upon dressing changes. The patient has no other complaints or associated symptoms. Patient works in an accounting firm.\par \par

## 2020-11-03 NOTE — REVIEW OF SYSTEMS
[Skin Wound] : skin wound [Negative] : Heme/Lymph [FreeTextEntry5] : hx HTN [FreeTextEntry7] : Hx Partial colon resection

## 2020-11-03 NOTE — ASSESSMENT
[FreeTextEntry1] : The patient presents with a wound to the left leg.  Swelling noted to the extremity.  \par MELIZA/PVR and standing venous reflux study scheduled.\par No clinical sign of infection

## 2020-11-04 ENCOUNTER — APPOINTMENT (OUTPATIENT)
Dept: WOUND CARE | Facility: CLINIC | Age: 50
End: 2020-11-04
Payer: COMMERCIAL

## 2020-11-04 VITALS
TEMPERATURE: 97.1 F | SYSTOLIC BLOOD PRESSURE: 135 MMHG | RESPIRATION RATE: 18 BRPM | HEART RATE: 69 BPM | DIASTOLIC BLOOD PRESSURE: 84 MMHG

## 2020-11-04 PROCEDURE — 11045 DBRDMT SUBQ TISS EACH ADDL: CPT

## 2020-11-04 PROCEDURE — 11042 DBRDMT SUBQ TIS 1ST 20SQCM/<: CPT

## 2020-11-04 PROCEDURE — 99072 ADDL SUPL MATRL&STAF TM PHE: CPT

## 2020-11-04 NOTE — PLAN
[FreeTextEntry1] : Wound Assessment and Plan:\par Apply lidocaine or topical anesthetic if needed to reduce pain upon washing the wound.\par Wash wound with ----0.9% saline/ Dakins 0.125% or Dove skin sensitive soap and clean water \par Apply ---- bryan  and /foam/\par Apply ----compression stocking or ACE\par Change dressing ---daily\par Leg elevation as tolerated\par Encouraged ambulation or exercise.\par Optimization of nutrition.\par \par -----Wound supplies ordered via DME\par Patient given contact information to DME\par \par -----Homecare orders in place\par \par Follow up appointment scheduled for 1 week\par \par TeleHealth Services discussed with the patient and/or family.  Discharge instructions given including download of Antonia information regarding:\par 1)  [facility] to obtain medical records\par 2)  [employer] [facility] [facility] to conduct Face-to-Face TeleHealth visit\par 3)  [employer] for the Patient (patient takes a picture of their wound which is sent to the patient's chart for review)\par \par

## 2020-11-04 NOTE — ASSESSMENT
[FreeTextEntry1] : The patient presents with a wound to the left leg.  Swelling noted to the extremity.  \par MELIZA/PVR and standing venous reflux study scheduled.\par No clinical sign of infection\par s/p excisional debridement today

## 2020-11-18 ENCOUNTER — APPOINTMENT (OUTPATIENT)
Dept: WOUND CARE | Facility: CLINIC | Age: 50
End: 2020-11-18
Payer: COMMERCIAL

## 2020-11-18 VITALS
HEART RATE: 84 BPM | WEIGHT: 230 LBS | SYSTOLIC BLOOD PRESSURE: 143 MMHG | BODY MASS INDEX: 38.32 KG/M2 | TEMPERATURE: 97.2 F | DIASTOLIC BLOOD PRESSURE: 84 MMHG | HEIGHT: 65 IN

## 2020-11-18 DIAGNOSIS — S81.802D UNSPECIFIED OPEN WOUND, LEFT LOWER LEG, SUBSEQUENT ENCOUNTER: ICD-10-CM

## 2020-11-18 PROCEDURE — 11045 DBRDMT SUBQ TISS EACH ADDL: CPT

## 2020-11-18 PROCEDURE — 99072 ADDL SUPL MATRL&STAF TM PHE: CPT

## 2020-11-18 PROCEDURE — 11042 DBRDMT SUBQ TIS 1ST 20SQCM/<: CPT

## 2020-11-25 ENCOUNTER — APPOINTMENT (OUTPATIENT)
Dept: WOUND CARE | Facility: CLINIC | Age: 50
End: 2020-11-25
Payer: COMMERCIAL

## 2020-11-25 VITALS — TEMPERATURE: 97.3 F

## 2020-11-25 PROCEDURE — 99072 ADDL SUPL MATRL&STAF TM PHE: CPT

## 2020-11-25 PROCEDURE — 11042 DBRDMT SUBQ TIS 1ST 20SQCM/<: CPT

## 2020-11-25 PROCEDURE — 11045 DBRDMT SUBQ TISS EACH ADDL: CPT

## 2020-11-25 PROCEDURE — 93970 EXTREMITY STUDY: CPT

## 2020-11-25 PROCEDURE — 93923 UPR/LXTR ART STDY 3+ LVLS: CPT

## 2020-11-25 NOTE — PLAN
[FreeTextEntry1] : 11/25/20\par Plan - c/w vashe wash/ soak , c/w vashe moistened to wound bed, hydrocortisone to proximal area above wound, moisture barrier to area distal and posterior to wound\par s/p excisional debridement\par Follow up next week

## 2020-11-25 NOTE — ASSESSMENT
[FreeTextEntry1] : The patient presents with a wound to the left leg.  Swelling noted to the extremity.  \par MELIZA/PVR and standing venous reflux study scheduled.\par No clinical sign of infection\par s/p excisional debridement today\par 11/25/20\par Had vascular studies today, both negative, results discussed, wound remains with slough over wound, proximal  to wound is erythemic and scaly, debrided today\par Has not seen derm. yet

## 2020-11-27 ENCOUNTER — NON-APPOINTMENT (OUTPATIENT)
Age: 50
End: 2020-11-27

## 2020-11-29 ENCOUNTER — LABORATORY RESULT (OUTPATIENT)
Age: 50
End: 2020-11-29

## 2020-11-30 ENCOUNTER — LABORATORY RESULT (OUTPATIENT)
Age: 50
End: 2020-11-30

## 2020-11-30 ENCOUNTER — APPOINTMENT (OUTPATIENT)
Dept: DERMATOLOGY | Facility: CLINIC | Age: 50
End: 2020-11-30
Payer: COMMERCIAL

## 2020-11-30 ENCOUNTER — NON-APPOINTMENT (OUTPATIENT)
Age: 50
End: 2020-11-30

## 2020-11-30 VITALS — HEIGHT: 65 IN | BODY MASS INDEX: 38.32 KG/M2 | WEIGHT: 230 LBS

## 2020-11-30 DIAGNOSIS — D48.5 NEOPLASM OF UNCERTAIN BEHAVIOR OF SKIN: ICD-10-CM

## 2020-11-30 DIAGNOSIS — L30.9 DERMATITIS, UNSPECIFIED: ICD-10-CM

## 2020-11-30 PROCEDURE — 11104 PUNCH BX SKIN SINGLE LESION: CPT

## 2020-11-30 PROCEDURE — 99072 ADDL SUPL MATRL&STAF TM PHE: CPT

## 2020-11-30 PROCEDURE — 99204 OFFICE O/P NEW MOD 45 MIN: CPT | Mod: 25

## 2020-11-30 PROCEDURE — 11105 PUNCH BX SKIN EA SEP/ADDL: CPT

## 2020-12-01 RX ORDER — TRIAMCINOLONE ACETONIDE 1 MG/G
0.1 OINTMENT TOPICAL
Qty: 1 | Refills: 3 | Status: ACTIVE | COMMUNITY
Start: 2020-12-01 | End: 1900-01-01

## 2020-12-02 ENCOUNTER — APPOINTMENT (OUTPATIENT)
Dept: WOUND CARE | Facility: CLINIC | Age: 50
End: 2020-12-02
Payer: COMMERCIAL

## 2020-12-02 VITALS — TEMPERATURE: 97.2 F | BODY MASS INDEX: 38.32 KG/M2 | WEIGHT: 230 LBS | HEIGHT: 65 IN

## 2020-12-02 VITALS — DIASTOLIC BLOOD PRESSURE: 80 MMHG | HEART RATE: 80 BPM | SYSTOLIC BLOOD PRESSURE: 129 MMHG

## 2020-12-02 PROCEDURE — 11042 DBRDMT SUBQ TIS 1ST 20SQCM/<: CPT

## 2020-12-02 PROCEDURE — 11045 DBRDMT SUBQ TISS EACH ADDL: CPT

## 2020-12-02 PROCEDURE — 99072 ADDL SUPL MATRL&STAF TM PHE: CPT

## 2020-12-02 NOTE — ASSESSMENT
[FreeTextEntry1] : The patient presents with a wound to the left leg.  Swelling noted to the extremity.  \par MELIZA/PVR and standing venous reflux study scheduled.\par No clinical sign of infection\par s/p excisional debridement today\par 11/25/20\par Had vascular studies today, both negative, results discussed, wound remains with slough over wound, proximal  to wound is erythemic and scaly, debrided today\par Has not seen derm. yet\par 12/2/20\par Went to derm. biopsied right leg, derm. ordered triamcinolone to left leg periwound, debrided today, moist slough over entire wound bed

## 2020-12-02 NOTE — PLAN
[FreeTextEntry1] : 12/2/20\par Plan - c/w vashe wash/ soak , c/w vashe moistened to wound bed, triamcinolone to periwound, advised to buy debrisoft to clean with and topical lidocaine spray for pain, culture obtained\par s/p excisional debridement\par Follow up next week

## 2020-12-04 ENCOUNTER — NON-APPOINTMENT (OUTPATIENT)
Age: 50
End: 2020-12-04

## 2020-12-08 ENCOUNTER — NON-APPOINTMENT (OUTPATIENT)
Age: 50
End: 2020-12-08

## 2020-12-08 ENCOUNTER — APPOINTMENT (OUTPATIENT)
Dept: INFECTIOUS DISEASE | Facility: CLINIC | Age: 50
End: 2020-12-08
Payer: COMMERCIAL

## 2020-12-08 VITALS
TEMPERATURE: 98.1 F | DIASTOLIC BLOOD PRESSURE: 92 MMHG | BODY MASS INDEX: 38.32 KG/M2 | HEART RATE: 85 BPM | OXYGEN SATURATION: 97 % | WEIGHT: 230 LBS | HEIGHT: 65 IN | SYSTOLIC BLOOD PRESSURE: 171 MMHG

## 2020-12-08 DIAGNOSIS — L08.9 OTHER INJURY OF UNSPECIFIED BODY REGION, INITIAL ENCOUNTER: ICD-10-CM

## 2020-12-08 DIAGNOSIS — T14.8XXA OTHER INJURY OF UNSPECIFIED BODY REGION, INITIAL ENCOUNTER: ICD-10-CM

## 2020-12-08 DIAGNOSIS — L29.9 PRURITUS, UNSPECIFIED: ICD-10-CM

## 2020-12-08 DIAGNOSIS — B74.4: ICD-10-CM

## 2020-12-08 PROCEDURE — 99205 OFFICE O/P NEW HI 60 MIN: CPT

## 2020-12-08 PROCEDURE — 99072 ADDL SUPL MATRL&STAF TM PHE: CPT

## 2020-12-09 ENCOUNTER — APPOINTMENT (OUTPATIENT)
Dept: WOUND CARE | Facility: CLINIC | Age: 50
End: 2020-12-09
Payer: COMMERCIAL

## 2020-12-09 DIAGNOSIS — M79.89 OTHER SPECIFIED SOFT TISSUE DISORDERS: ICD-10-CM

## 2020-12-09 PROCEDURE — 11042 DBRDMT SUBQ TIS 1ST 20SQCM/<: CPT

## 2020-12-09 PROCEDURE — 11045 DBRDMT SUBQ TISS EACH ADDL: CPT

## 2020-12-09 PROCEDURE — 99072 ADDL SUPL MATRL&STAF TM PHE: CPT

## 2020-12-09 NOTE — PHYSICAL EXAM
[Please See PDF for [employer]] : Please See PDF for [employer]. [Normal Breath Sounds] : Normal breath sounds [JVD] : no jugular venous distention  [de-identified] : NAD, ambulatory [de-identified] : AT [de-identified] : supple

## 2020-12-09 NOTE — ASSESSMENT
[FreeTextEntry1] : The patient presents with a wound to the left leg.  Swelling noted to the extremity.  \par MELIZA/PVR and standing venous reflux study scheduled.\par No clinical sign of infection\par s/p excisional debridement today\par 11/25/20\par Had vascular studies today, both negative, results discussed, wound remains with slough over wound, proximal  to wound is erythemic and scaly, debrided today\par Has not seen derm. yet\par 12/2/20\par Went to derm. biopsied right leg, derm. ordered triamcinolone to left leg periwound, debrided today, moist slough over entire wound bed\par 12/9/2020\par MRSA\par Excisional Debridment preformed on bilateral leg wound with a curette. patient wrapped with Dakins wet to dry, valeriano and Ace wrap. patient to return to office in 2weeks.

## 2020-12-09 NOTE — HISTORY OF PRESENT ILLNESS
[FreeTextEntry1] : Ms. MAGDIEL BEAN   presents to the office with a wound since April of 2020 after bumping her leg in her office at work. The wound is located on  the left leg. The patient has complaints of pain.  The patient has been dressing the wound with gauze and valeriano.  The patient denies fevers or chills. The patient has localized pain to the wound upon dressing changes. The patient has no other complaints or associated symptoms. Patient works in an accounting firm.\par Patient found to have parasite \par

## 2020-12-10 LAB — BACTERIA SPEC CULT: ABNORMAL

## 2020-12-12 PROBLEM — S81.802D WOUND OF LEFT LOWER EXTREMITY, SUBSEQUENT ENCOUNTER: Status: ACTIVE | Noted: 2017-07-27

## 2020-12-12 NOTE — PLAN
[FreeTextEntry1] : 12/2/20\par Plan - c/w vashe wash/ soak , c/w vashe moistened to wound bed, triamcinolone to periwound, advised to buy debrisoft to clean with and topical lidocaine spray for pain, culture obtained\par s/p excisional debridement\par Follow up next week \par 11/18/2020\par s/p excisional debridement\par compression therapy

## 2020-12-12 NOTE — PHYSICAL EXAM
[JVD] : no jugular venous distention  [Normal Breath Sounds] : Normal breath sounds [de-identified] : NAD, ambulatory [de-identified] : AT [de-identified] : supple [Please See PDF for [employer]] : Please See PDF for [employer].

## 2020-12-15 ENCOUNTER — NON-APPOINTMENT (OUTPATIENT)
Age: 50
End: 2020-12-15

## 2020-12-23 ENCOUNTER — APPOINTMENT (OUTPATIENT)
Dept: WOUND CARE | Facility: CLINIC | Age: 50
End: 2020-12-23
Payer: COMMERCIAL

## 2020-12-23 VITALS
SYSTOLIC BLOOD PRESSURE: 151 MMHG | DIASTOLIC BLOOD PRESSURE: 82 MMHG | HEART RATE: 89 BPM | RESPIRATION RATE: 17 BRPM | TEMPERATURE: 97.6 F

## 2020-12-23 DIAGNOSIS — S81.802A UNSPECIFIED OPEN WOUND, LEFT LOWER LEG, INITIAL ENCOUNTER: ICD-10-CM

## 2020-12-23 DIAGNOSIS — S81.801D UNSPECIFIED OPEN WOUND, RIGHT LOWER LEG, SUBSEQUENT ENCOUNTER: ICD-10-CM

## 2020-12-23 DIAGNOSIS — S81.801A UNSPECIFIED OPEN WOUND, RIGHT LOWER LEG, INITIAL ENCOUNTER: ICD-10-CM

## 2020-12-23 PROCEDURE — 99213 OFFICE O/P EST LOW 20 MIN: CPT

## 2020-12-23 PROCEDURE — 99072 ADDL SUPL MATRL&STAF TM PHE: CPT

## 2020-12-23 RX ORDER — LINEZOLID 600 MG/1
600 TABLET, FILM COATED ORAL
Qty: 14 | Refills: 0 | Status: DISCONTINUED | COMMUNITY
Start: 2020-12-09 | End: 2020-12-23

## 2020-12-23 RX ORDER — BETAMETHASONE DIPROPIONATE 0.5 MG/G
0.05 CREAM, AUGMENTED TOPICAL TWICE DAILY
Qty: 80 | Refills: 0 | Status: DISCONTINUED | COMMUNITY
Start: 2017-09-14 | End: 2020-12-23

## 2020-12-23 RX ORDER — CIPROFLOXACIN HYDROCHLORIDE 500 MG/1
500 TABLET, FILM COATED ORAL
Qty: 14 | Refills: 0 | Status: DISCONTINUED | COMMUNITY
Start: 2020-12-09 | End: 2020-12-23

## 2020-12-23 NOTE — PHYSICAL EXAM
[JVD] : no jugular venous distention  [Normal Breath Sounds] : Normal breath sounds [de-identified] : NAD, ambulatory [de-identified] : AT [de-identified] : supple [Please See PDF for [employer]] : Please See PDF for [employer].

## 2020-12-29 ENCOUNTER — NON-APPOINTMENT (OUTPATIENT)
Age: 50
End: 2020-12-29

## 2020-12-30 ENCOUNTER — APPOINTMENT (OUTPATIENT)
Dept: WOUND CARE | Facility: CLINIC | Age: 50
End: 2020-12-30

## 2021-01-05 PROBLEM — L29.9 PRURITUS: Status: ACTIVE | Noted: 2017-09-14

## 2021-01-05 PROBLEM — B74.4: Status: ACTIVE | Noted: 2021-01-05

## 2021-01-05 PROBLEM — T14.8XXA WOUND INFECTION: Status: ACTIVE | Noted: 2021-01-05

## 2021-01-05 NOTE — ASSESSMENT
[FreeTextEntry1] : Ms. Obrien is a 50 year old female with HTN, HLD referred by Dermatology clinic for bilateral LE wounds, with biopsy of right wound demonstration monsonellosis. The patient states that she has diffuse pruritis of her skin, starting about 4 years ago when she initially had a bug bite on her left medial leg. The wound since healed, but she continued to have pruritus. 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 Edwards of [facility], and she will see the patient evaluation - patient's information given to Dr. Edwards.\par -Will start Linezolid and Cipro for the positive wound culture - MRSA and Pseudomonas\par -Continue local wound care\par -Follows with Wound Care and Dermatology\par -Check blood smear, CBC for eosinophilia, CMP, CRP, O&P culture, wound culture for AFB, bacterial, and fungal, Quant TB gold\par \par #RLE wound\par -Does not appear infected at this time\par -Continue local wound care\par \par RTC in 4-6 weeks.

## 2021-01-05 NOTE — HISTORY OF PRESENT ILLNESS
[FreeTextEntry1] : Ms Obrine is a 50 year old female sent to ID clinic by her Dermatologist Dr. Mcdermott for evaluation for right leg wound found to have on biopsy Mansonella. The patient stated she bumped herself at work and the area became pruritic around March/ early April. She started to scratch the wound. 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 She continued to scratch the wound. She has similar lesions on her bilateral arms and abdomen. Currently took no antibiotics. Lives with her parents. No travel recently. Has been to [place] 1986, and [place] a few times, last 2013. Parents only traveled to [place] and [place]. She also has a left lower extremity ulcer where she bumped herself around May 2020 and now with circumferential ulcer.\par \par Her history includes a bug bite 4 years leg ago on the left medial leg and got infected and swelled. Since then has developed diffuse generalized pruritus. She completed antibiotics at that time and the left leg wound improved over several months with wound care. \par \par She has a scab on her right leg in May from bumping it which was biopsied and with mansonella. Wound culture also with MRSA and Pseudomonas aeruginosa.\par \par She has NKDA. Her medical history includes HTN and HLD. Her medications include Benacar 40/25 once daily, Amlodipine 10 mg PO once daily, Atorvastatin 10 mg po daily. Her family history includes Parents with prediabetes, Mother with h/o Shingles, Sjogrens, HTN, HLD; Father with history of CHF, HTN, HLD. Her social history includes: nonsmoker, no alcohol, no drug use. 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 Her cell is [phone] where she can be reached.\par \par

## 2021-01-05 NOTE — PHYSICAL EXAM
[General Appearance - Alert] : alert [General Appearance - Well Nourished] : well nourished [General Appearance - Well-Appearing] : healthy appearing [Sclera] : the sclera and conjunctiva were normal [PERRL With Normal Accommodation] : pupils were equal in size, round, reactive to light [Extraocular Movements] : extraocular movements were intact [Outer Ear] : the ears and nose were normal in appearance [Hearing Threshold Finger Rub Not Crook] : hearing was normal [Examination Of The Oral Cavity] : the lips and gums were normal [Oropharynx] : the oropharynx was normal with no thrush [Neck Appearance] : the appearance of the neck was normal [Neck Cervical Mass (___cm)] : no neck mass was observed [] : no respiratory distress [Exaggerated Use Of Accessory Muscles For Inspiration] : no accessory muscle use [Auscultation Breath Sounds / Voice Sounds] : lungs were clear to auscultation bilaterally [Heart Rate And Rhythm] : heart rate was normal and rhythm regular [Heart Sounds] : normal S1 and S2 [Murmurs] : no murmurs [Edema] : there was no peripheral edema [Bowel Sounds] : normal bowel sounds [Abdomen Soft] : soft [Abdomen Tenderness] : non-tender [No Palpable Adenopathy] : no palpable adenopathy [Musculoskeletal - Swelling] : no joint swelling [Range of Motion to Joints] : range of motion to joints [Nail Clubbing] : no clubbing  or cyanosis of the fingernails [Motor Tone] : muscle strength and tone were normal [Sensation] : the sensory exam was normal to light touch and pinprick [Motor Exam] : the motor exam was normal [No Focal Deficits] : no focal deficits [Oriented To Time, Place, And Person] : oriented to person, place, and time [Affect] : the affect was normal [FreeTextEntry1] : LLE wound, no drainage, no malodor; RLE wound with drainage

## 2021-01-05 NOTE — REVIEW OF SYSTEMS
[As Noted in HPI] : as noted in HPI [Negative] : Heme/Lymph [de-identified] : Diffuse Pruritus, bilateral LE wounds

## 2021-01-05 NOTE — CONSULT LETTER
[Dear  ___] : Dear  [unfilled], [Consult Letter:] : I had the pleasure of evaluating your patient, [unfilled]. [( Thank you for referring [unfilled] for consultation for _____ )] : Thank you for referring [unfilled] for consultation for [unfilled] [Please see my note below.] : Please see my note below. [Sincerely,] : Sincerely, [FreeTextEntry3] : Tino Fowler MD, FACP\par Attending Physician\par Division of Infectious Diseases\par [address]\[place] [address]\[facility] Office: [phone]\par Fax: [phone]\par Email: [email or web address] \par [facility]\par Visit us at [facility] \par

## 2021-01-13 ENCOUNTER — NON-APPOINTMENT (OUTPATIENT)
Age: 51
End: 2021-01-13

## 2021-01-29 ENCOUNTER — APPOINTMENT (OUTPATIENT)
Dept: WOUND CARE | Facility: CLINIC | Age: 51
End: 2021-01-29

## 2021-02-22 ENCOUNTER — APPOINTMENT (OUTPATIENT)
Dept: WOUND CARE | Facility: CLINIC | Age: 51
End: 2021-02-22

## 2021-04-20 NOTE — ED ADULT NURSE NOTE - DRUG PRE-SCREENING (DAST -1)
Statement Selected Complex Repair And Z Plasty Text: The defect edges were debeveled with a #15 scalpel blade.  The primary defect was closed partially with a complex linear closure.  Given the location of the remaining defect, shape of the defect and the proximity to free margins a Z plasty was deemed most appropriate for complete closure of the defect.  Using a sterile surgical marker, an appropriate advancement flap was drawn incorporating the defect and placing the expected incisions within the relaxed skin tension lines where possible.    The area thus outlined was incised deep to adipose tissue with a #15 scalpel blade.  The skin margins were undermined to an appropriate distance in all directions utilizing iris scissors.

## 2024-09-04 NOTE — PATIENT PROFILE ADULT. - FUNCTIONAL SCREEN CURRENT LEVEL: BATHING, MLM
Dad calling to schedule appt for pt for nausea and vomiting >10 days   (Pt asked dad to make appt)    Dropped pt off at [facility] last Monday.   Every day since, pt wakes up nauseous and vomits; once gets up and moving he is fine through rest of day     Denies any other sxs    At first thought it was anxiety but sx have persisted  Dad states pt has had a hx of pancreatitis last yr where he was hospitalized for 3-4 days    Pt will be est care while at [facility]  Scheduled Friday     Beckie MORGAN RN  [facility]     (2) assistive person